# Patient Record
Sex: FEMALE | Race: ASIAN | NOT HISPANIC OR LATINO | Employment: OTHER | URBAN - METROPOLITAN AREA
[De-identification: names, ages, dates, MRNs, and addresses within clinical notes are randomized per-mention and may not be internally consistent; named-entity substitution may affect disease eponyms.]

---

## 2018-02-13 ENCOUNTER — OFFICE VISIT (OUTPATIENT)
Dept: MEDICAL GROUP | Facility: MEDICAL CENTER | Age: 78
End: 2018-02-13
Payer: MEDICARE

## 2018-02-13 VITALS
HEIGHT: 59 IN | RESPIRATION RATE: 14 BRPM | OXYGEN SATURATION: 95 % | HEART RATE: 72 BPM | DIASTOLIC BLOOD PRESSURE: 68 MMHG | BODY MASS INDEX: 18.14 KG/M2 | WEIGHT: 90 LBS | SYSTOLIC BLOOD PRESSURE: 116 MMHG | TEMPERATURE: 97.6 F

## 2018-02-13 DIAGNOSIS — E78.5 HYPERLIPIDEMIA, UNSPECIFIED HYPERLIPIDEMIA TYPE: ICD-10-CM

## 2018-02-13 DIAGNOSIS — Z82.49 FAMILY HISTORY OF HEART DISEASE: ICD-10-CM

## 2018-02-13 DIAGNOSIS — Z12.11 SCREEN FOR COLON CANCER: ICD-10-CM

## 2018-02-13 DIAGNOSIS — E55.9 VITAMIN D DEFICIENCY: ICD-10-CM

## 2018-02-13 DIAGNOSIS — M81.0 AGE-RELATED OSTEOPOROSIS WITHOUT CURRENT PATHOLOGICAL FRACTURE: ICD-10-CM

## 2018-02-13 PROCEDURE — 99204 OFFICE O/P NEW MOD 45 MIN: CPT | Performed by: FAMILY MEDICINE

## 2018-02-13 ASSESSMENT — PATIENT HEALTH QUESTIONNAIRE - PHQ9: CLINICAL INTERPRETATION OF PHQ2 SCORE: 0

## 2018-02-13 NOTE — LETTER
[employer]  Opal Delaney M.D.  [address]  [place] [address]  Fax: [phone]   Authorization for Release/Disclosure of   Protected Health Information   Name: COURT SANDERS : 1940 SSN: [id number]   Address: [facility] [address]  [place] [address] Phone:    [phone] (home)    I authorize the entity listed below to release/disclose the PHI below to:   [facility]/Opal Delaney M.D. and Opal Delaney M.D.   Provider or Entity Name:  Dr nehemiah lópez [facility])   Address   City, [facility], [facility]   Phone:      Fax:     Reason for request: continuity of care   Information to be released:    [  ] LAST COLONOSCOPY,  including any PATH REPORT and follow-up  [  ] LAST FIT/COLOGUARD RESULT [  ] LAST DEXA  [  ] LAST MAMMOGRAM  [  ] LAST PAP  [  ] LAST LABS [  ] RETINA EXAM REPORT  [  ] IMMUNIZATION RECORDS  [x ] Release all info      [  ] Check here and initial the line next to each item to release ALL health information INCLUDING  _____ Care and treatment for drug and / or alcohol abuse  _____ HIV testing, infection status, or AIDS  _____ Genetic Testing    DATES OF SERVICE OR TIME PERIOD TO BE DISCLOSED: _____________  I understand and acknowledge that:  * This Authorization may be revoked at any time by you in writing, except if your health information has already been used or disclosed.  * Your health information that will be used or disclosed as a result of you signing this authorization could be re-disclosed by the recipient. If this occurs, your re-disclosed health information may no longer be protected by State or Federal laws.  * You may refuse to sign this Authorization. Your refusal will not affect your ability to obtain treatment.  * This Authorization becomes effective upon signing and will  on (date) __________.      If no date is indicated, this Authorization will  one (1) year from the signature date.    Name: Court Sanders    Signature:   Date:          2/13/2018       PLEASE FAX REQUESTED RECORDS BACK TO: [phone]

## 2018-02-13 NOTE — PROGRESS NOTES
cc: Elevated cholesterol    Subjective:     Court Cortes is a 77 y.o. female presenting to establish care and to discuss the followin. Cholesterol: Has a history of mildly elevated cholesterol. Is currently not taking any medications for this, has not needed this. She eats very healthy, is vegetarian and lactose intolerant. She exercises regularly every day. She likes to have her cholesterol checked once a year.    2. Vitamin D deficiency, osteoporosis: Has a history of vitamin D deficiency and osteoporosis. She reports that her last DEXA scan was several years ago, is not interested in repeating this as she knows that this will not change. She was on a medication for osteoporosis, but stopped it after 2 years as she was worried about developing jaw necrosis. Denies any fractures. Has been taking vitamin D supplements regularly    3. Heart disease: Has a family history of heart disease and strokes. She likes to check her CRP levels as a marker for inflammation. She requests this lab. She does have a history of a subarachnoid hemorrhage. But this was likely secondary to a fall after vigorous exercise and dehydration. She has had no neurologic issues such    Review of systems:  See above.   Denies any weight loss, fevers, fatigue, vision changes, sore throat, swollen glands, rashes, shortness of breath, chest pain, numbness, nausea, vomiting, diarrhea, constipation, abdominal pain, dysuria, hematuria,  joint pain, muscle weakness, depression. All other systems were reviewed and are negative      Current Outpatient Prescriptions:   •  Probiotic Product (PROBIOTIC DAILY PO), Take  by mouth., Disp: , Rfl:   •  Nutritional Supplements (VITAMIN D MAINTENANCE PO), Take  by mouth., Disp: , Rfl:   •  Multiple Vitamins-Minerals (EYE VITAMINS) TABS, Take 1 Tab by mouth every day., Disp: , Rfl:     Allergies   Allergen Reactions   • Sulfamethoxazole-Trimethoprim [Bactrim Ds] Nausea       Past Medical History:   Diagnosis  "Date   • SAH (subarachnoid hemorrhage) (CMS-HCC) 1/23/2015     Past Surgical History:   Procedure Laterality Date   • TONSILLECTOMY       Family History   Problem Relation Age of Onset   • Stroke Sister    • Hypertension Sister    • Stroke Brother    • Hypertension Brother      Social History     Social History   • Marital status: Single     Spouse name: N/A   • Number of children: N/A   • Years of education: N/A     Occupational History   • Not on file.     Social History Main Topics   • Smoking status: Former Smoker   • Smokeless tobacco: Never Used   • Alcohol use No   • Drug use: No   • Sexual activity: Not on file     Other Topics Concern   • Not on file     Social History Narrative   • No narrative on file       Objective:     Vitals: /68   Pulse 72   Temp 36.4 °C (97.6 °F)   Resp 14   Ht 1.499 m (4' 11\")   Wt 40.8 kg (90 lb)   SpO2 95%   BMI 18.18 kg/m²   General: Alert, pleasant, NAD  HEENT: Normocephalic.  PERRL, EOMI, no icterus or pallor.  Conjunctivae and lids normal. External ears normal.  Neck supple.  No thyromegaly or masses palpated. No cervical or supraclavicular lymphadenopathy.  Heart: Regular rate and rhythm.  S1 and S2 normal.  No murmurs appreciated.  Respiratory: Normal respiratory effort.  Clear to auscultation bilaterally.  Abdomen: Non-distended, soft  Skin: Warm, dry, no rashes.  Musculoskeletal: Gait is normal.  Moves all extremities well.  Extremities: No leg edema.  Radial pulses 2+ symmetric.   Neurological: No tremors  Psych:  Affect/mood is normal, judgement is good, memory is intact, grooming is appropriate.    Assessment/Plan:     Diagnoses and all orders for this visit:    Hyperlipidemia, unspecified hyperlipidemia type  Continue with healthy diet and exercise. Labs ordered  -     LIPID PROFILE; Future  -     COMP METABOLIC PANEL; Future    Vitamin D deficiency  Continue supplementation  -     VITAMIN D,25 HYDROXY; Future    Age-related osteoporosis without current " pathological fracture  Stable, continue supplementation with vitamin D and calcium. She declines another DEXA scan    Family history of heart disease  Discussed nonspecific nature of CRP levels. Recommended that she check with insurance first to see if this lab is covered  -     CRP HIGH SENSITIVE (CARDIAC); Future    Screen for colon cancer  -     OCCULT BLOOD FECES IMMUNOASSAY; Future      Records requested. She declined immunizations and mammogram      Return in about 1 year (around 2/13/2019) for routine follow up.

## 2018-03-16 ENCOUNTER — HOSPITAL ENCOUNTER (INPATIENT)
Facility: MEDICAL CENTER | Age: 78
LOS: 1 days | DRG: 392 | End: 2018-03-18
Attending: EMERGENCY MEDICINE | Admitting: STUDENT IN AN ORGANIZED HEALTH CARE EDUCATION/TRAINING PROGRAM
Payer: MEDICARE

## 2018-03-16 ENCOUNTER — RESOLUTE PROFESSIONAL BILLING HOSPITAL PROF FEE (OUTPATIENT)
Dept: HOSPITALIST | Facility: MEDICAL CENTER | Age: 78
End: 2018-03-16
Payer: MEDICARE

## 2018-03-16 DIAGNOSIS — R11.2 NON-INTRACTABLE VOMITING WITH NAUSEA, UNSPECIFIED VOMITING TYPE: ICD-10-CM

## 2018-03-16 DIAGNOSIS — R19.7 DIARRHEA, UNSPECIFIED TYPE: ICD-10-CM

## 2018-03-16 DIAGNOSIS — E87.1 HYPONATREMIA: ICD-10-CM

## 2018-03-16 PROBLEM — E86.0 DEHYDRATION: Status: ACTIVE | Noted: 2018-03-16

## 2018-03-16 PROBLEM — R11.10 VOMITING: Status: ACTIVE | Noted: 2018-03-16

## 2018-03-16 LAB
ALBUMIN SERPL BCP-MCNC: 3.6 G/DL (ref 3.2–4.9)
ALBUMIN/GLOB SERPL: 1.3 G/DL
ALP SERPL-CCNC: 95 U/L (ref 30–99)
ALT SERPL-CCNC: 12 U/L (ref 2–50)
ANION GAP SERPL CALC-SCNC: 8 MMOL/L (ref 0–11.9)
APPEARANCE UR: CLEAR
AST SERPL-CCNC: 25 U/L (ref 12–45)
BASOPHILS # BLD AUTO: 0.4 % (ref 0–1.8)
BASOPHILS # BLD: 0.02 K/UL (ref 0–0.12)
BILIRUB SERPL-MCNC: 0.6 MG/DL (ref 0.1–1.5)
BUN SERPL-MCNC: 9 MG/DL (ref 8–22)
CALCIUM SERPL-MCNC: 8.6 MG/DL (ref 8.4–10.2)
CHLORIDE SERPL-SCNC: 97 MMOL/L (ref 96–112)
CO2 SERPL-SCNC: 20 MMOL/L (ref 20–33)
COLOR UR: YELLOW
CREAT SERPL-MCNC: 0.63 MG/DL (ref 0.5–1.4)
EOSINOPHIL # BLD AUTO: 0.03 K/UL (ref 0–0.51)
EOSINOPHIL NFR BLD: 0.5 % (ref 0–6.9)
ERYTHROCYTE [DISTWIDTH] IN BLOOD BY AUTOMATED COUNT: 38 FL (ref 35.9–50)
GLOBULIN SER CALC-MCNC: 2.7 G/DL (ref 1.9–3.5)
GLUCOSE SERPL-MCNC: 109 MG/DL (ref 65–99)
GLUCOSE UR STRIP.AUTO-MCNC: NEGATIVE MG/DL
HCT VFR BLD AUTO: 34.9 % (ref 37–47)
HGB BLD-MCNC: 12.3 G/DL (ref 12–16)
IMM GRANULOCYTES # BLD AUTO: 0.01 K/UL (ref 0–0.11)
IMM GRANULOCYTES NFR BLD AUTO: 0.2 % (ref 0–0.9)
KETONES UR STRIP.AUTO-MCNC: 40 MG/DL
LEUKOCYTE ESTERASE UR QL STRIP.AUTO: NEGATIVE
LYMPHOCYTES # BLD AUTO: 0.7 K/UL (ref 1–4.8)
LYMPHOCYTES NFR BLD: 12.5 % (ref 22–41)
MCH RBC QN AUTO: 31.5 PG (ref 27–33)
MCHC RBC AUTO-ENTMCNC: 35.2 G/DL (ref 33.6–35)
MCV RBC AUTO: 89.3 FL (ref 81.4–97.8)
MONOCYTES # BLD AUTO: 0.26 K/UL (ref 0–0.85)
MONOCYTES NFR BLD AUTO: 4.7 % (ref 0–13.4)
NEUTROPHILS # BLD AUTO: 4.57 K/UL (ref 2–7.15)
NEUTROPHILS NFR BLD: 81.7 % (ref 44–72)
NITRITE UR QL STRIP.AUTO: NEGATIVE
NRBC # BLD AUTO: 0 K/UL
NRBC BLD-RTO: 0 /100 WBC
PH UR STRIP.AUTO: 7 [PH]
PLATELET # BLD AUTO: 233 K/UL (ref 164–446)
PMV BLD AUTO: 8.6 FL (ref 9–12.9)
POTASSIUM SERPL-SCNC: 3.6 MMOL/L (ref 3.6–5.5)
PROT SERPL-MCNC: 6.3 G/DL (ref 6–8.2)
PROT UR QL STRIP: NEGATIVE MG/DL
RBC # BLD AUTO: 3.91 M/UL (ref 4.2–5.4)
RBC UR QL AUTO: ABNORMAL
SODIUM SERPL-SCNC: 125 MMOL/L (ref 135–145)
SP GR UR STRIP.AUTO: 1.01
WBC # BLD AUTO: 5.6 K/UL (ref 4.8–10.8)

## 2018-03-16 PROCEDURE — 99285 EMERGENCY DEPT VISIT HI MDM: CPT

## 2018-03-16 PROCEDURE — 96374 THER/PROPH/DIAG INJ IV PUSH: CPT

## 2018-03-16 PROCEDURE — 85025 COMPLETE CBC W/AUTO DIFF WBC: CPT

## 2018-03-16 PROCEDURE — 81002 URINALYSIS NONAUTO W/O SCOPE: CPT

## 2018-03-16 PROCEDURE — G0378 HOSPITAL OBSERVATION PER HR: HCPCS

## 2018-03-16 PROCEDURE — 99219 PR INITIAL OBSERVATION CARE,LEVL II: CPT | Performed by: STUDENT IN AN ORGANIZED HEALTH CARE EDUCATION/TRAINING PROGRAM

## 2018-03-16 PROCEDURE — 700105 HCHG RX REV CODE 258: Performed by: EMERGENCY MEDICINE

## 2018-03-16 PROCEDURE — 700111 HCHG RX REV CODE 636 W/ 250 OVERRIDE (IP): Performed by: EMERGENCY MEDICINE

## 2018-03-16 PROCEDURE — 36415 COLL VENOUS BLD VENIPUNCTURE: CPT

## 2018-03-16 PROCEDURE — 80053 COMPREHEN METABOLIC PANEL: CPT

## 2018-03-16 RX ORDER — ONDANSETRON 2 MG/ML
4 INJECTION INTRAMUSCULAR; INTRAVENOUS ONCE
Status: COMPLETED | OUTPATIENT
Start: 2018-03-16 | End: 2018-03-16

## 2018-03-16 RX ORDER — SODIUM CHLORIDE 9 MG/ML
INJECTION, SOLUTION INTRAVENOUS CONTINUOUS
Status: DISCONTINUED | OUTPATIENT
Start: 2018-03-16 | End: 2018-03-17

## 2018-03-16 RX ADMIN — ONDANSETRON 4 MG: 2 INJECTION INTRAMUSCULAR; INTRAVENOUS at 21:20

## 2018-03-16 RX ADMIN — SODIUM CHLORIDE: 9 INJECTION, SOLUTION INTRAVENOUS at 22:03

## 2018-03-16 ASSESSMENT — PAIN SCALES - GENERAL: PAINLEVEL_OUTOF10: 0

## 2018-03-17 PROBLEM — K52.9 GASTROENTERITIS: Status: ACTIVE | Noted: 2018-03-17

## 2018-03-17 LAB
SODIUM SERPL-SCNC: 131 MMOL/L (ref 135–145)
SODIUM SERPL-SCNC: 136 MMOL/L (ref 135–145)
SODIUM SERPL-SCNC: 141 MMOL/L (ref 135–145)

## 2018-03-17 PROCEDURE — 36415 COLL VENOUS BLD VENIPUNCTURE: CPT

## 2018-03-17 PROCEDURE — 770006 HCHG ROOM/CARE - MED/SURG/GYN SEMI*

## 2018-03-17 PROCEDURE — 700105 HCHG RX REV CODE 258: Performed by: STUDENT IN AN ORGANIZED HEALTH CARE EDUCATION/TRAINING PROGRAM

## 2018-03-17 PROCEDURE — 700111 HCHG RX REV CODE 636 W/ 250 OVERRIDE (IP): Performed by: STUDENT IN AN ORGANIZED HEALTH CARE EDUCATION/TRAINING PROGRAM

## 2018-03-17 PROCEDURE — 99232 SBSQ HOSP IP/OBS MODERATE 35: CPT | Performed by: INTERNAL MEDICINE

## 2018-03-17 PROCEDURE — 84295 ASSAY OF SERUM SODIUM: CPT

## 2018-03-17 RX ORDER — SODIUM CHLORIDE 9 MG/ML
INJECTION, SOLUTION INTRAVENOUS CONTINUOUS
Status: DISCONTINUED | OUTPATIENT
Start: 2018-03-17 | End: 2018-03-18 | Stop reason: HOSPADM

## 2018-03-17 RX ORDER — PANTOPRAZOLE SODIUM 40 MG/10ML
40 INJECTION, POWDER, LYOPHILIZED, FOR SOLUTION INTRAVENOUS DAILY
Status: DISCONTINUED | OUTPATIENT
Start: 2018-03-17 | End: 2018-03-18 | Stop reason: HOSPADM

## 2018-03-17 RX ORDER — ONDANSETRON 2 MG/ML
4 INJECTION INTRAMUSCULAR; INTRAVENOUS EVERY 4 HOURS PRN
Status: DISCONTINUED | OUTPATIENT
Start: 2018-03-17 | End: 2018-03-18 | Stop reason: HOSPADM

## 2018-03-17 RX ADMIN — ONDANSETRON 4 MG: 2 INJECTION INTRAMUSCULAR; INTRAVENOUS at 01:17

## 2018-03-17 RX ADMIN — SODIUM CHLORIDE: 9 INJECTION, SOLUTION INTRAVENOUS at 00:55

## 2018-03-17 RX ADMIN — SODIUM CHLORIDE: 9 INJECTION, SOLUTION INTRAVENOUS at 07:52

## 2018-03-17 ASSESSMENT — LIFESTYLE VARIABLES
EVER_SMOKED: NEVER
DO YOU DRINK ALCOHOL: NO

## 2018-03-17 ASSESSMENT — ENCOUNTER SYMPTOMS
SHORTNESS OF BREATH: 0
BLOOD IN STOOL: 0
MUSCULOSKELETAL NEGATIVE: 1
WEAKNESS: 1
EYES NEGATIVE: 1
DEPRESSION: 0
DIAPHORESIS: 0
CHILLS: 0
DIZZINESS: 0
BLURRED VISION: 0
ABDOMINAL PAIN: 1
RESPIRATORY NEGATIVE: 1
DIARRHEA: 1
HEARTBURN: 0
FOCAL WEAKNESS: 0
CARDIOVASCULAR NEGATIVE: 1
MYALGIAS: 0
PND: 0
NAUSEA: 1
SINUS PAIN: 0
WHEEZING: 0
SORE THROAT: 0
SPUTUM PRODUCTION: 0
COUGH: 0
HEMOPTYSIS: 0
ORTHOPNEA: 0
VOMITING: 1
FEVER: 0
PSYCHIATRIC NEGATIVE: 1
CONSTIPATION: 0
PALPITATIONS: 0
ABDOMINAL PAIN: 0

## 2018-03-17 ASSESSMENT — PATIENT HEALTH QUESTIONNAIRE - PHQ9
2. FEELING DOWN, DEPRESSED, IRRITABLE, OR HOPELESS: NOT AT ALL
SUM OF ALL RESPONSES TO PHQ9 QUESTIONS 1 AND 2: 0
SUM OF ALL RESPONSES TO PHQ QUESTIONS 1-9: 0
1. LITTLE INTEREST OR PLEASURE IN DOING THINGS: NOT AT ALL

## 2018-03-17 ASSESSMENT — PAIN SCALES - GENERAL
PAINLEVEL_OUTOF10: 0

## 2018-03-17 NOTE — H&P
Hospital Medicine History and Physical    Date of Service  3/16/2018    Chief Complaint  Chief Complaint   Patient presents with   • N/V     Onset today at 4pm   • Diarrhea       History of Presenting Illness  77 y.o. female who presented 3/16/2018 with multiple episodes of vomiting and diarrhea starting 3 PM this afternoon. Patient states that she had soup from [employer] the night before and woke up nauseous. Her symptoms have not resolved and then. She also describes weakness, denies fevers or chills. No sick contacts. Denies chest pain shortness of breath or cough. No new medications or antibiotics. No abdominal pain.   Primary Care Physician  Opal Delaney M.D.    Consultants  None    Code Status  DNR    Review of Systems  Review of Systems   Constitutional: Positive for malaise/fatigue. Negative for chills, diaphoresis and fever.   HENT: Negative for congestion, ear pain, hearing loss, sinus pain, sore throat and tinnitus.    Eyes: Negative for blurred vision.   Respiratory: Negative for cough, hemoptysis, sputum production, shortness of breath and wheezing.    Cardiovascular: Negative for chest pain, palpitations, orthopnea, leg swelling and PND.   Gastrointestinal: Positive for diarrhea, nausea and vomiting. Negative for abdominal pain, blood in stool, constipation and heartburn.   Genitourinary: Negative for dysuria and urgency.   Musculoskeletal: Negative for myalgias.   Skin: Negative for rash.   Neurological: Positive for weakness. Negative for dizziness and focal weakness.   Psychiatric/Behavioral: Negative for depression.        Past Medical History  Past Medical History:   Diagnosis Date   • SAH (subarachnoid hemorrhage) (CMS-[facility]) 1/23/2015       Surgical History  Past Surgical History:   Procedure Laterality Date   • TONSILLECTOMY         Medications  No current facility-administered medications on file prior to encounter.      Current Outpatient Prescriptions on File Prior to Encounter    Medication Sig Dispense Refill   • Probiotic Product (PROBIOTIC DAILY PO) Take  by mouth.     • Nutritional Supplements (VITAMIN D MAINTENANCE PO) Take  by mouth.     • Multiple Vitamins-Minerals (EYE VITAMINS) TABS Take 1 Tab by mouth every day.         Family History  Family History   Problem Relation Age of Onset   • Stroke Sister    • Hypertension Sister    • Stroke Brother    • Hypertension Brother        Social History  Social History   Substance Use Topics   • Smoking status: Former Smoker   • Smokeless tobacco: Never Used   • Alcohol use No       Allergies  Allergies   Allergen Reactions   • Sulfamethoxazole-Trimethoprim [Bactrim Ds] Nausea        Physical Exam  Laboratory   Hemodynamics  Temp (24hrs), Av.8 °C (98.2 °F), Min:36.8 °C (98.2 °F), Max:36.8 °C (98.2 °F)   Temperature: 36.8 °C (98.2 °F)  Pulse  Av.8  Min: 85  Max: 97    Blood Pressure : 111/64, NIBP: 122/65      Respiratory      Respiration: 18, Pulse Oximetry: 95 %             Physical Exam   Constitutional: She is oriented to person, place, and time. She appears well-developed.   HENT:   Head: Normocephalic and atraumatic.   Eyes: Conjunctivae are normal. Pupils are equal, round, and reactive to light.   Neck: Normal range of motion. No JVD present.   Cardiovascular: Normal rate, regular rhythm and normal heart sounds.    No murmur heard.  Pulmonary/Chest: Effort normal and breath sounds normal. No respiratory distress. She has no wheezes. She has no rales. She exhibits no tenderness.   Abdominal: Soft. She exhibits no distension. There is no tenderness. There is no rebound and no guarding.   Musculoskeletal: Normal range of motion. She exhibits no edema.   Neurological: She is alert and oriented to person, place, and time.   Skin: Skin is warm.       Recent Labs      18   2125   WBC  5.6   RBC  3.91*   HEMOGLOBIN  12.3   HEMATOCRIT  34.9*   MCV  89.3   MCH  31.5   MCHC  35.2*   RDW  38.0   PLATELETCT  233   MPV  8.6*     Recent  Labs      03/16/18 2125   SODIUM  125*   POTASSIUM  3.6   CHLORIDE  97   CO2  20   GLUCOSE  109*   BUN  9   CREATININE  0.63   CALCIUM  8.6     Recent Labs      03/16/18 2125   ALTSGPT  12   ASTSGOT  25   ALKPHOSPHAT  95   TBILIRUBIN  0.6   GLUCOSE  109*                 Lab Results   Component Value Date    TROPONINI <0.01 01/23/2015     Urinalysis:    Lab Results  Component Value Date/Time   SPECGRAVITY 1.008 01/23/2015 1700   GLUCOSEUR Negative 01/23/2015 1700   KETONES 40 (A) 01/23/2015 1700   NITRITE Negative 01/23/2015 1700        Imaging    None    Assessment/Plan     I anticipate this patient is appropriate for observation status at this point.    Vomiting- (present on admission)   Assessment & Plan    Likely gastroenteritis-like picture. Will place patient on antiemetics and clear liquid diet to advance as tolerated.        Diarrhea- (present on admission)   Assessment & Plan    Patient presents with diarrhea that started at 3 PM today with multiple episodes of vomiting. Patient thinks that it is secondary to some soup she had had the night before from [employer]. No sick contacts. No fevers or chills. Patient has low risk for C. diff otherwise. Will monitor the patient on fluids for now and observe overnight. If patient's symptoms do not improve would consider obtaining a CT abdomen and possibly initiating antibiotics, however will hold off on antibiotics for now.        Hyponatremia- (present on admission)   Assessment & Plan    Likely hypovolemic due to vomiting and diarrhea. We'll check serum sodiums every 4 and start an S at 100 mL per hour. Will hold off on further workup unless serum sodium worsens.        Dehydration- (present on admission)   Assessment & Plan    Secondary to poor oral intake, vomiting and diarrhea. Will place the patient on IV fluids and assess clinical response, we'll start clears as tolerated.            VTE prophylaxis: SCDs

## 2018-03-17 NOTE — CARE PLAN
Problem: Nutritional:  Goal: Achieve adequate nutritional intake  Diet advancement beyond clears with PO intake of 50%  Outcome: NOT MET

## 2018-03-17 NOTE — PROGRESS NOTES
Pt arrived via gurney to unit. Pt able to ambulate from gurney to bed with hand held assist. Pt awake and alert. States no pain at this time.

## 2018-03-17 NOTE — PROGRESS NOTES
[facility] Hospitalist Progress Note    Date of Service: 3/17/2018    Chief Complaint  77 y.o. female with past medical history of  SAH , admitted 3/16/2018 with complaints of nausea, vomiting, diarrhea, abdominal pain, poor appetite started on the day of admission.    Interval Problem Update  Patient thinks she has food poisoning in from soap from [employer] the night before symptoms started. Apparently her symptoms has largely resolved by now. She denies fever or chills. No abdominal pain. Slightly nauseated still. No bowel movement since yesterday. Apparent generalized weakness. Started clear liquid diet. Low-sodium improved. Rehydrated. On IV fluids.    Consultants/Specialty  None    Disposition  Home when clinically improved        Review of Systems   Constitutional: Positive for malaise/fatigue.   HENT: Negative.    Eyes: Negative.    Respiratory: Negative.    Cardiovascular: Negative.    Gastrointestinal: Positive for abdominal pain, diarrhea, nausea and vomiting.   Genitourinary: Negative.    Musculoskeletal: Negative.    Skin: Negative.    Neurological: Positive for weakness.   Endo/Heme/Allergies: Negative.    Psychiatric/Behavioral: Negative.    All other systems reviewed and are negative.     Physical Exam  Laboratory/Imaging   Hemodynamics  Temp (24hrs), Av.7 °C (98.1 °F), Min:36.7 °C (98 °F), Max:36.8 °C (98.2 °F)   Temperature: 36.7 °C (98 °F)  Pulse  Av  Min: 85  Max: 98    Blood Pressure : 100/56, NIBP: 122/65      Respiratory      Respiration: 18, Pulse Oximetry: 93 %             Fluids    Intake/Output Summary (Last 24 hours) at 18 1229  Last data filed at 18 1200   Gross per 24 hour   Intake              940 ml   Output                0 ml   Net              940 ml       Nutrition  Orders Placed This Encounter   Procedures   • Diet Order     Standing Status:   Standing     Number of Occurrences:   1     Order Specific Question:   Diet:     Answer:   Clear Liquid [10]      Physical Exam   Constitutional: She is oriented to person, place, and time. She appears well-developed and well-nourished.   HENT:   Head: Normocephalic and atraumatic.   Eyes: EOM are normal. Pupils are equal, round, and reactive to light.   Neck: Normal range of motion. Neck supple.   Cardiovascular: Normal rate and regular rhythm.    Pulmonary/Chest: Effort normal and breath sounds normal.   Abdominal: Soft. Bowel sounds are normal. There is no tenderness.   Musculoskeletal: Normal range of motion.   Neurological: She is alert and oriented to person, place, and time.   Skin: Skin is warm and dry.   Psychiatric: Her speech is delayed.   Nursing note and vitals reviewed.      Recent Labs      03/16/18 2125   WBC  5.6   RBC  3.91*   HEMOGLOBIN  12.3   HEMATOCRIT  34.9*   MCV  89.3   MCH  31.5   MCHC  35.2*   RDW  38.0   PLATELETCT  233   MPV  8.6*     Recent Labs      03/16/18 2125  03/17/18   0212  03/17/18   0602  03/17/18   1011   SODIUM  125*  131*  136  141   POTASSIUM  3.6   --    --    --    CHLORIDE  97   --    --    --    CO2  20   --    --    --    GLUCOSE  109*   --    --    --    BUN  9   --    --    --    CREATININE  0.63   --    --    --    CALCIUM  8.6   --    --    --                       Assessment/Plan     Vomiting- (present on admission)   Assessment & Plan    Antiemetics as needed        Diarrhea- (present on admission)   Assessment & Plan    Improving. Low risk for Cdiff or parasites        Gastroenteritis   Assessment & Plan    Supportive treatment, IV fluid.  Improving.  Low suspicion for infectious process.        Hyponatremia- (present on admission)   Assessment & Plan    Resolved        Dehydration- (present on admission)   Assessment & Plan    IV fluids. start clears as tolerated.          Quality-Core Measures

## 2018-03-17 NOTE — ED NOTES
Discussed POC with ERP. Pt soiled herself and requesting female assistance. Per ERP no need to straight cath pt, obtain urine specimen when pt voids next.

## 2018-03-17 NOTE — DIETARY
"Nutrition services: Day 1 of admit.  Court Cortes is a 77 y.o. female with admitting DX of diarrhea and vomiting  Patient seen for BMI less than 19.  BMI = 18.85    Assessment:  Height: 147.3 cm (4' 10\")  Weight: 40.9 kg (90 lb 2.7 oz)  Body mass index is 18.85 kg/m².  Diet Rx:  Clear Liquids    Evaluation:   1. Patient with vomiting and diarrhea on day of admit.  Per MD notes, likely gastroenteritis.  Possibly secondary to soup the patient ate the night before.  2. No reported weight loss or poor PO intake prior to admit.  3. Labs and medications reviewed  4. Skin:  WDL    Recommendations/Plan:  1. RD to monitor diet advancement and PO intake.  2. Nutrition Services to work with patient for food preferences when diet advanced.        "

## 2018-03-17 NOTE — PROGRESS NOTES
Pt up to the br x 2 so far with sba.gait steady.voiding w/o diff.  Iv infusing.lucy clear liquids.  No n/v  Denies pain at present.

## 2018-03-17 NOTE — ED PROVIDER NOTES
ED Provider Note    CHIEF COMPLAINT  Chief Complaint   Patient presents with   • N/V     Onset today at 4pm   • Diarrhea        HPI  Court Cortes is a 77 y.o. female who presents for evaluation of vomiting and diarrhea. The patient denies any fevers. She has no pain. She states at 4 PM this afternoon she started having vomiting and diarrhea that have been continuous since then. She thinks that she may have gotten food poisoning from something she ate at lunch today. She has no history of previous abdominal surgeries. She is not a diabetic. The patient states that she does feel somewhat dehydrated.    REVIEW OF SYSTEMS  See HPI for further details. All other systems are negative.     PAST MEDICAL HISTORY  Past Medical History:   Diagnosis Date   • SAH (subarachnoid hemorrhage) (CMS-[facility]) 1/23/2015       FAMILY HISTORY  Family History   Problem Relation Age of Onset   • Stroke Sister    • Hypertension Sister    • Stroke Brother    • Hypertension Brother        SOCIAL HISTORY  Social History     Social History   • Marital status: Single     Spouse name: N/A   • Number of children: N/A   • Years of education: N/A     Social History Main Topics   • Smoking status: Former Smoker   • Smokeless tobacco: Never Used   • Alcohol use No   • Drug use: No   • Sexual activity: Not on file     Other Topics Concern   • Not on file     Social History Narrative   • No narrative on file       SURGICAL HISTORY  Past Surgical History:   Procedure Laterality Date   • TONSILLECTOMY         CURRENT MEDICATIONS  Home Medications     Reviewed by Georgiana Marie R.N. (Registered Nurse) on 03/16/18 at 2100  Med List Status: <None>   Medication Last Dose Status   Multiple Vitamins-Minerals (EYE VITAMINS) TABS 2/13/2018 Active   Nutritional Supplements (VITAMIN D MAINTENANCE PO) 2/13/2018 Active   Probiotic Product (PROBIOTIC DAILY PO) 2/13/2018 Active                ALLERGIES  Allergies   Allergen Reactions   • Sulfamethoxazole-Trimethoprim  "[Bactrim Ds] Nausea       PHYSICAL EXAM  VITAL SIGNS: /64   Pulse 85   Temp 36.8 °C (98.2 °F)   Resp 18   Ht 1.422 m (4' 8\")   Wt 40.8 kg (90 lb)   SpO2 95%   BMI 20.18 kg/m²     Constitutional: Thin elderly female holding an emesis bag to her mouth with no active vomiting..   HENT: Normocephalic, Atraumatic. Somewhat dry mucous membranes.  Eyes: EOMI, Conjunctiva normal, No discharge.   Cardiovascular: Normal heart rate, Normal rhythm, No murmurs, No rubs, No gallops.   Thorax & Lungs: Lungs clear to auscultation bilaterally without wheezes, rales or rhonchi. No respiratory distress.    Abdomen: Soft and nontender.  Skin: Warm, Dry.   Musculoskeletal: Good range of motion in all major joints.  Neurologic: Awake alert, No focal deficits noted.       COURSE & MEDICAL DECISION MAKING  Pertinent Labs & Imaging studies reviewed. (See chart for details)  This is a 77-year-old here for evaluation of vomiting and diarrhea. She is not tachycardic or hypotensive. She is afebrile. She has completely benign abdominal exam. An IV is established and laboratories were obtained. These included chemistries which are normal with the exception of a sodium of 125. CBC shows normal white count with a differential of 81 polys and 12 lymphocytes. She is treated with Zofran IV. Upon repeat evaluation she states her nausea is completely resolved and she has had no further vomiting here. She states she still feeling quite dry and very weak. For that reason she will be started on a normal saline drip. With her hyponatremia and generalized weakness I suspect that she will benefit from hospitalization for hydration which I think should resolve her hyponatremia. I discussed case with the hospitalist and they will be the primary admitting physicians. Patient understands the plan for admission.    FINAL IMPRESSION  1. Nausea and vomiting  2. Diarrhea  3. Hyponatremia         Electronically signed by: Tomi Gonzalez, 3/16/2018 9:16 " PM

## 2018-03-17 NOTE — ED NOTES
Pt assisted to BSC. Pt very weak. 1 person assist to BSC. Urine specimen obtained and POC urinalysis running.

## 2018-03-17 NOTE — ED TRIAGE NOTES
"Chief Complaint   Patient presents with   • N/V     Onset today at 4pm   • Diarrhea     /64   Pulse 85   Temp 36.8 °C (98.2 °F)   Resp 18   Ht 1.422 m (4' 8\")   Wt 40.8 kg (90 lb)   SpO2 95%   BMI 20.18 kg/m²     Pt life partner Thang at bedside. Reports lower back pain for last few days after bending over and feeling something pulled.  "

## 2018-03-17 NOTE — DISCHARGE PLANNING
Medical Social Work    Referral: Discharge Planning     Intervention: Per attending the pt is projected to discharge home tomorrow with not needs    Plan: Discharge home no needs.

## 2018-03-17 NOTE — PROGRESS NOTES
Tele Summary @6946    Rhythm : Sinus Rhythm, First degree AVB   Ectopy : none  HR : 90  OR : 0.26  QRS : 0.08  QT : 0.40     Any further monitoring will be done by patient's Primary Nurse.

## 2018-03-17 NOTE — PROGRESS NOTES
Telemetry Summary    Rhythm ST  -101  Ectopy No ectopy, 1st degree HB  DC 0.22  QRS 0.08  QT 0.36

## 2018-03-18 VITALS
OXYGEN SATURATION: 95 % | BODY MASS INDEX: 18.93 KG/M2 | SYSTOLIC BLOOD PRESSURE: 111 MMHG | HEIGHT: 58 IN | TEMPERATURE: 97.9 F | DIASTOLIC BLOOD PRESSURE: 54 MMHG | HEART RATE: 72 BPM | WEIGHT: 90.17 LBS | RESPIRATION RATE: 18 BRPM

## 2018-03-18 LAB
ANION GAP SERPL CALC-SCNC: 7 MMOL/L (ref 0–11.9)
BASOPHILS # BLD AUTO: 0.7 % (ref 0–1.8)
BASOPHILS # BLD: 0.02 K/UL (ref 0–0.12)
BUN SERPL-MCNC: 8 MG/DL (ref 8–22)
CALCIUM SERPL-MCNC: 8.2 MG/DL (ref 8.4–10.2)
CHLORIDE SERPL-SCNC: 110 MMOL/L (ref 96–112)
CO2 SERPL-SCNC: 23 MMOL/L (ref 20–33)
CREAT SERPL-MCNC: 0.71 MG/DL (ref 0.5–1.4)
EOSINOPHIL # BLD AUTO: 0.1 K/UL (ref 0–0.51)
EOSINOPHIL NFR BLD: 3.7 % (ref 0–6.9)
ERYTHROCYTE [DISTWIDTH] IN BLOOD BY AUTOMATED COUNT: 42.5 FL (ref 35.9–50)
GLUCOSE SERPL-MCNC: 70 MG/DL (ref 65–99)
HCT VFR BLD AUTO: 35.5 % (ref 37–47)
HGB BLD-MCNC: 11.6 G/DL (ref 12–16)
IMM GRANULOCYTES # BLD AUTO: 0 K/UL (ref 0–0.11)
IMM GRANULOCYTES NFR BLD AUTO: 0 % (ref 0–0.9)
LYMPHOCYTES # BLD AUTO: 0.89 K/UL (ref 1–4.8)
LYMPHOCYTES NFR BLD: 33 % (ref 22–41)
MCH RBC QN AUTO: 31 PG (ref 27–33)
MCHC RBC AUTO-ENTMCNC: 32.7 G/DL (ref 33.6–35)
MCV RBC AUTO: 94.9 FL (ref 81.4–97.8)
MONOCYTES # BLD AUTO: 0.4 K/UL (ref 0–0.85)
MONOCYTES NFR BLD AUTO: 14.8 % (ref 0–13.4)
NEUTROPHILS # BLD AUTO: 1.29 K/UL (ref 2–7.15)
NEUTROPHILS NFR BLD: 47.8 % (ref 44–72)
NRBC # BLD AUTO: 0 K/UL
NRBC BLD-RTO: 0 /100 WBC
PLATELET # BLD AUTO: 244 K/UL (ref 164–446)
PMV BLD AUTO: 9.1 FL (ref 9–12.9)
POTASSIUM SERPL-SCNC: 3.3 MMOL/L (ref 3.6–5.5)
RBC # BLD AUTO: 3.74 M/UL (ref 4.2–5.4)
SODIUM SERPL-SCNC: 140 MMOL/L (ref 135–145)
WBC # BLD AUTO: 2.7 K/UL (ref 4.8–10.8)

## 2018-03-18 PROCEDURE — 80048 BASIC METABOLIC PNL TOTAL CA: CPT

## 2018-03-18 PROCEDURE — 85025 COMPLETE CBC W/AUTO DIFF WBC: CPT

## 2018-03-18 PROCEDURE — 700102 HCHG RX REV CODE 250 W/ 637 OVERRIDE(OP): Performed by: INTERNAL MEDICINE

## 2018-03-18 PROCEDURE — 36415 COLL VENOUS BLD VENIPUNCTURE: CPT

## 2018-03-18 PROCEDURE — A9270 NON-COVERED ITEM OR SERVICE: HCPCS | Performed by: INTERNAL MEDICINE

## 2018-03-18 PROCEDURE — 99239 HOSP IP/OBS DSCHRG MGMT >30: CPT | Performed by: INTERNAL MEDICINE

## 2018-03-18 RX ORDER — POTASSIUM CHLORIDE 20 MEQ/1
40 TABLET, EXTENDED RELEASE ORAL ONCE
Status: COMPLETED | OUTPATIENT
Start: 2018-03-18 | End: 2018-03-18

## 2018-03-18 RX ADMIN — POTASSIUM CHLORIDE 40 MEQ: 1500 TABLET, EXTENDED RELEASE ORAL at 12:20

## 2018-03-18 ASSESSMENT — PAIN SCALES - GENERAL
PAINLEVEL_OUTOF10: 0

## 2018-03-18 NOTE — DISCHARGE INSTRUCTIONS
Discharge Instructions    Discharged to home by car with relative. Discharged via wheelchair, hospital escort: Yes.  Special equipment needed: Not Applicable    Be sure to schedule a follow-up appointment with your primary care doctor or any specialists as instructed.     Discharge Plan:   Diet Plan: Discussed  Activity Level: Discussed  Confirmed Follow up Appointment: Patient to Call and Schedule Appointment  Medication Reconciliation Updated: Yes  Influenza Vaccine Indication: Patient Refuses    I understand that a diet low in cholesterol, fat, and sodium is recommended for good health. Unless I have been given specific instructions below for another diet, I accept this instruction as my diet prescription.   Other diet: gastric soft diet    Special Instructions: None    · Is patient discharged on Warfarin / Coumadin?   No       Diarrhea, Adult  Diarrhea is frequent loose and watery bowel movements. Diarrhea can make you feel weak and cause you to become dehydrated. Dehydration can make you tired and thirsty, cause you to have a dry mouth, and decrease how often you urinate. Diarrhea typically lasts 2-3 days. However, it can last longer if it is a sign of something more serious. It is important to treat your diarrhea as told by your health care provider.  Follow these instructions at home:  Eating and drinking  Follow these recommendations as told by your health care provider:  · Take an oral rehydration solution (ORS). This is a drink that is sold at pharmacies and retail stores.  · Drink clear fluids, such as water, ice chips, diluted fruit juice, and low-calorie sports drinks.  · Eat bland, easy-to-digest foods in small amounts as you are able. These foods include bananas, applesauce, rice, lean meats, toast, and crackers.  · Avoid drinking fluids that contain a lot of sugar or caffeine, such as energy drinks, sports drinks, and soda.  · Avoid alcohol.  · Avoid spicy or fatty foods.  General  instructions  · Drink enough fluid to keep your urine clear or pale yellow.  · Wash your hands often. If soap and water are not available, use hand .  · Make sure that all people in your household wash their hands well and often.  · Take over-the-counter and prescription medicines only as told by your health care provider.  · Rest at home while you recover.  · Watch your condition for any changes.  · Take a warm bath to relieve any burning or pain from frequent diarrhea episodes.  · Keep all follow-up visits as told by your health care provider. This is important.  Contact a health care provider if:  · You have a fever.  · Your diarrhea gets worse.  · You have new symptoms.  · You cannot keep fluids down.  · You feel light-headed or dizzy.  · You have a headache  · You have muscle cramps.  Get help right away if:  · You have chest pain.  · You feel extremely weak or you faint.  · You have bloody or black stools or stools that look like tar.  · You have severe pain, cramping, or bloating in your abdomen.  · You have trouble breathing or you are breathing very quickly.  · Your heart is beating very quickly.  · Your skin feels cold and clammy.  · You feel confused.  · You have signs of dehydration, such as:  ¨ Dark urine, very little urine, or no urine.  ¨ Cracked lips.  ¨ Dry mouth.  ¨ Sunken eyes.  ¨ Sleepiness.  ¨ Weakness.  This information is not intended to replace advice given to you by your health care provider. Make sure you discuss any questions you have with your health care provider.  Document Released: 12/08/2003 Document Revised: 04/27/2017 Document Reviewed: 08/23/2016  Elsevier Interactive Patient Education © 2017 [employer]      Nausea, Adult  Introduction  Feeling sick to your stomach (nausea) means that your stomach is upset or you feel like you have to throw up (vomit). Feeling sick to your stomach is usually not serious, but it may be an early sign of a more serious medical problem. As  you feel sicker to your stomach, it can lead to throwing up (vomiting). If you throw up, or if you are not able to drink enough fluids, there is a risk of dehydration. Dehydration can make you feel tired and thirsty, have a dry mouth, and pee (urinate) less often. Older adults and people who have other diseases or a weak defense (immune) system have a higher risk of dehydration.  The main goal of treating this condition is to:  · Limit how often you feel sick to your stomach.  · Prevent throwing up and dehydration.  Follow these instructions at home:  Follow instructions from your doctor about how to care for yourself at home.  Eating and drinking  Follow these recommendations as told by your doctor:  · Take an oral rehydration solution (ORS). This is a drink that is sold at pharmacies and stores.  · Drink clear fluids in small amounts as you are able, such as:  ¨ Water.  ¨ Ice chips.  ¨ Fruit juice that has water added (diluted fruit juice).  ¨ Low-calorie sports drinks.  · Eat bland, easy to digest foods in small amounts as you are able, such as:  ¨ Bananas.  ¨ Applesauce.  ¨ Rice.  ¨ Lean meats.  ¨ Toast.  ¨ Crackers.  · Avoid drinking fluids that contain a lot of sugar or caffeine.  · Avoid alcohol.  · Avoid spicy or fatty foods.  General instructions  · Drink enough fluid to keep your pee (urine) clear or pale yellow.  · Wash your hands often. If you cannot use soap and water, use hand .  · Make sure that all people in your household wash their hands well and often.  · Rest at home while you get better.  · Take over-the-counter and prescription medicines only as told by your doctor.  · Breathe slowly and deeply when you feel sick to your stomach.  · Watch your condition for any changes.  · Keep all follow-up visits as told by your doctor. This is important.  Contact a doctor if:  · You have a headache.  · You have new symptoms.  · You feel sicker to your stomach.  · You have a fever.  · You feel  light-headed or dizzy.  · You throw up.  · You are not able to keep fluids down.  Get help right away if:  · You have pain in your chest, neck, arm, or jaw.  · You feel very weak or you pass out (faint).  · You have throw up that is bright red or looks like coffee grounds.  · You have bloody or black poop (stools), or poop that looks like tar.  · You have a very bad headache, a stiff neck, or both.  · You have very bad pain, cramping, or bloating in your belly.  · You have a rash.  · You have trouble breathing or you are breathing very quickly.  · Your heart is beating very quickly.  · Your skin feels cold and clammy.  · You feel confused.  · You have pain while peeing.  · You have signs of dehydration, such as:  ¨ Dark pee, or very little or no pee.  ¨ Cracked lips.  ¨ Dry mouth.  ¨ Sunken eyes.  ¨ Sleepiness.  ¨ Weakness.  These symptoms may be an emergency. Do not wait to see if the symptoms will go away. Get medical help right away. Call your local emergency services (911 in the [place] Do not drive yourself to the hospital.   This information is not intended to replace advice given to you by your health care provider. Make sure you discuss any questions you have with your health care provider.  Document Released: 12/06/2012 Document Revised: 05/25/2017 Document Reviewed: 08/23/2016  © 2017 [employer]    Depression / Suicide Risk    As you are discharged from this [facility] facility, it is important to learn how to keep safe from harming yourself.    Recognize the warning signs:  · Abrupt changes in personality, positive or negative- including increase in energy   · Giving away possessions  · Change in eating patterns- significant weight changes-  positive or negative  · Change in sleeping patterns- unable to sleep or sleeping all the time   · Unwillingness or inability to communicate  · Depression  · Unusual sadness, discouragement and loneliness  · Talk of wanting to die  · Neglect of personal  appearance   · Rebelliousness- reckless behavior  · Withdrawal from people/activities they love  · Confusion- inability to concentrate     If you or a loved one observes any of these behaviors or has concerns about self-harm, here's what you can do:  · Talk about it- your feelings and reasons for harming yourself  · Remove any means that you might use to hurt yourself (examples: pills, rope, extension cords, firearm)  · Get professional help from the community (Mental Health, Substance Abuse, psychological counseling)  · Do not be alone:Call your Safe Contact- someone whom you trust who will be there for you.  · Call your local CRISIS HOTLINE 217-7179 or 379-778-1181  · Call your local Children's Mobile Crisis Response Team [place] (293) 740-0461 or www.Medigram  · Call the toll free National Suicide Prevention Hotlines   · National Suicide Prevention Lifeline 276-562-LYXP (8762)  · National Hope Line Network 800-SUICIDE (524-1835)

## 2018-03-18 NOTE — DISCHARGE SUMMARY
CHIEF COMPLAINT ON ADMISSION  Chief Complaint   Patient presents with   • N/V     Onset today at 4pm   • Diarrhea       CODE STATUS  DNR    HPI & HOSPITAL COURSE  This is a 77 y.o. female here with complaints of nausea, vomiting, diarrhea, abdominal pain after presumed food poisoning. For details see H&P note.  Patient was treated with IV fluids, antiemetics for gastroenteritis and dehydration. Symptoms resolved, including nausea, vomiting, diarrhea, abdominal pain. Patient started on clear liquid diet and advanced to GI soft diet. Hyponatremia resolved. Low potassium replaced.    The patient met 2-midnight criteria for an inpatient stay at the time of discharge.    Therefore, she is discharged in good and stable condition with close outpatient follow-up.    SPECIFIC OUTPATIENT FOLLOW-UP  pcp    DISCHARGE PROBLEM LIST  Active Problems:    Diarrhea POA: Yes    Vomiting POA: Yes    Dehydration POA: Yes    Hyponatremia POA: Yes    Gastroenteritis POA: Unknown  Resolved Problems:    * No resolved hospital problems. *      FOLLOW UP  Future Appointments  Date Time Provider Department Center   3/20/2018 2:00 PM CARE MANAGER [facility] JOS[facility]   3/23/2018 2:20 PM JENIFER Sher [facility]   3/30/2018 10:40 AM Opal Delnaey M.D. 77M[facility] SARAYDENA Delaney M.D.  [address]  [facility] [address]  [facility] [address]  [phone]      as needed      MEDICATIONS ON DISCHARGE   Court Cortes   Home Medication Instructions YANNICK:[id number]    Printed on:03/18/18 1205   Medication Information                      Nutritional Supplements (VITAMIN D MAINTENANCE PO)  Take  by mouth.             Probiotic Product (PROBIOTIC DAILY PO)  Take  by mouth.                 DIET  Orders Placed This Encounter   Procedures   • DIET ORDER     Standing Status:   Standing     Number of Occurrences:   1     Order Specific Question:   Diet:     Answer:   Low Fiber(GI Soft) [2]       ACTIVITY  As  tolerated.      CONSULTATIONS  none    PROCEDURES  none    LABORATORY  Lab Results   Component Value Date/Time    SODIUM 140 03/18/2018 04:49 AM    POTASSIUM 3.3 (L) 03/18/2018 04:49 AM    CHLORIDE 110 03/18/2018 04:49 AM    CO2 23 03/18/2018 04:49 AM    GLUCOSE 70 03/18/2018 04:49 AM    BUN 8 03/18/2018 04:49 AM    CREATININE 0.71 03/18/2018 04:49 AM        Lab Results   Component Value Date/Time    WBC 2.7 (L) 03/18/2018 04:49 AM    HEMOGLOBIN 11.6 (L) 03/18/2018 04:49 AM    HEMATOCRIT 35.5 (L) 03/18/2018 04:49 AM    PLATELETCT 244 03/18/2018 04:49 AM      No orders to display         Total time of the discharge process exceeds 45 minutes

## 2018-03-18 NOTE — PROGRESS NOTES
Received report resting in bed alert and oriented x4,denies any pain,no diarrhea reported.Discussed POC and verbalized understanding.Instructed to call for any needs call light with in reach.Anxious to go home.

## 2018-03-18 NOTE — PROGRESS NOTES
Up ad kevin in the room,tolerated diet and voiding adequate amounts.Discharge instructions given and verbalized understanding.Will facilitate discharge via wheelchair.

## 2018-04-13 ENCOUNTER — OFFICE VISIT (OUTPATIENT)
Dept: MEDICAL GROUP | Facility: MEDICAL CENTER | Age: 78
End: 2018-04-13
Payer: MEDICARE

## 2018-04-13 VITALS
OXYGEN SATURATION: 96 % | BODY MASS INDEX: 17.94 KG/M2 | TEMPERATURE: 98.6 F | SYSTOLIC BLOOD PRESSURE: 112 MMHG | HEART RATE: 78 BPM | WEIGHT: 89 LBS | RESPIRATION RATE: 14 BRPM | HEIGHT: 59 IN | DIASTOLIC BLOOD PRESSURE: 62 MMHG

## 2018-04-13 DIAGNOSIS — K52.9 GASTROENTERITIS: ICD-10-CM

## 2018-04-13 DIAGNOSIS — E87.6 HYPOKALEMIA: ICD-10-CM

## 2018-04-13 DIAGNOSIS — R55 SYNCOPE, UNSPECIFIED SYNCOPE TYPE: ICD-10-CM

## 2018-04-13 PROBLEM — R11.10 VOMITING: Status: RESOLVED | Noted: 2018-03-16 | Resolved: 2018-04-13

## 2018-04-13 PROBLEM — E86.0 DEHYDRATION: Status: RESOLVED | Noted: 2018-03-16 | Resolved: 2018-04-13

## 2018-04-13 PROBLEM — E87.1 HYPONATREMIA: Status: RESOLVED | Noted: 2018-03-16 | Resolved: 2018-04-13

## 2018-04-13 PROBLEM — R19.7 DIARRHEA: Status: RESOLVED | Noted: 2018-03-16 | Resolved: 2018-04-13

## 2018-04-13 PROCEDURE — 99214 OFFICE O/P EST MOD 30 MIN: CPT | Performed by: FAMILY MEDICINE

## 2018-04-13 PROCEDURE — 93000 ELECTROCARDIOGRAM COMPLETE: CPT | Performed by: FAMILY MEDICINE

## 2018-04-13 RX ORDER — UBIDECARENONE 75 MG
100 CAPSULE ORAL DAILY
COMMUNITY
End: 2019-07-15

## 2018-04-13 NOTE — PROGRESS NOTES
"cc: Syncopal episodes    Subjective:     Court Cortes is a 77 y.o. female presenting to discuss recurrent syncopal episodes. In 2015, she was admitted to [facility] after a syncopal episode. It was thought to be secondary to dehydration as she was working out at the gym heavily. She did develop a subarachnoid hemorrhage, but this was thought to be secondary to her fall when she passed out. She reports another episode of syncope one summer when she was running around at the park, does not remember the exact time. Paramedics showed up, but she declined to go the hospital. The third time she passed out, she was going to the bathroom and had passed out on her commode. She also does not remember approximately when this occurred. Last month, she had an episode of gastroenteritis, was in the hospital for a couple days as she was quite dehydrated. Approximately 2 weeks after discharge, she was having lunch with friends when she suddenly felt nauseated, somewhat lightheaded, and felt the need to defecate. She then passed out for a couple seconds, her friends poured some water over her face and she was able to wake up. Several days later, she had an episode of nausea, chills, rigors, lightheadedness, but this seemed to resolve after she ate a lot of sugar and drink a lot of water. Since then, she has been drinking 2 60 ounce water bottles every day. Currently feels well. Denies any headaches, chest pain, shortness breath, lightheadedness, dizziness, abdominal pain, diarrhea, bladder symptoms, diarrhea, constipation. She reports that before her syncopal episodes, she has never felt palpitations, chest pain, shortness of breath. Nobody has reported to her that she had seizure-like movements or muscle twitching. She has not been incontinent of bowel or bladder during these episodes. After these episodes, she reports that her head feels somewhat \"heavy\", but does not feel confused, is able to function and communicate " "well    Review of systems:  See above.      Current Outpatient Prescriptions:   •  cyanocobalamin (VITAMIN B-12) 100 MCG Tab, Take 100 mcg by mouth every day., Disp: , Rfl:   •  LUTEIN PO, Take  by mouth., Disp: , Rfl:   •  Probiotic Product (PROBIOTIC DAILY PO), Take  by mouth., Disp: , Rfl:   •  Nutritional Supplements (VITAMIN D MAINTENANCE PO), Take  by mouth., Disp: , Rfl:     Allergies   Allergen Reactions   • Sulfamethoxazole-Trimethoprim [Bactrim Ds] Nausea       Past Medical History:   Diagnosis Date   • SAH (subarachnoid hemorrhage) ([facility]) 1/23/2015     Past Surgical History:   Procedure Laterality Date   • TONSILLECTOMY       Family History   Problem Relation Age of Onset   • Stroke Sister    • Hypertension Sister    • Stroke Brother    • Hypertension Brother      Social History     Social History   • Marital status: Single     Spouse name: N/A   • Number of children: N/A   • Years of education: N/A     Occupational History   • Not on file.     Social History Main Topics   • Smoking status: Former Smoker   • Smokeless tobacco: Never Used   • Alcohol use No   • Drug use: No   • Sexual activity: Not on file     Other Topics Concern   • Not on file     Social History Narrative   • No narrative on file       Objective:     Vitals: /62   Pulse 78   Temp 37 °C (98.6 °F)   Resp 14   Ht 1.499 m (4' 11.02\")   Wt 40.4 kg (89 lb)   SpO2 96%   BMI 17.97 kg/m²   General: Alert, pleasant, NAD  HEENT: Normocephalic.  PERRL, EOMI, no icterus or pallor.  Conjunctivae and lids normal. External ears normal.  Oropharynx non-erythematous, mucous membranes moist.  Neck supple.  No thyromegaly or masses palpated. No cervical or supraclavicular lymphadenopathy.  Heart: Regular rate and rhythm.  S1 and S2 normal.  No murmurs appreciated.  Respiratory: Normal respiratory effort.  Clear to auscultation bilaterally.  Abdomen: Non-distended, soft  Skin: Warm, dry, no rashes.  Musculoskeletal: Gait is normal.  Moves " all extremities well.  Extremities: No leg edema.   Psych:  Affect/mood is normal, judgement is good, memory is intact, grooming is appropriate.    Ekg: hr 75 bpm, sinus rhythm, normal axis, normal intervals, r wave progression; no st segment changes    Assessment/Plan:     Court was seen today for follow-up.    Diagnoses and all orders for this visit:    Syncope, unspecified syncope type  Possible vasovagal episodes, but we'll check the following labs, Holter monitor, carotid ultrasound, MRI of the brain. EKG was unremarkable today. Referral to neurology and cardiology placed  -     COMP METABOLIC PANEL; Future  -     CBC WITH DIFFERENTIAL; Future  -     TSH WITH REFLEX TO FT4; Future  -     HEMOGLOBIN A1C; Future  -     HOLTER MONITOR / EVENT RECORDER  -     US-CAROTID DOPPLER; Future  -     MR-BRAIN-WITH & W/O; Future  -     REFERRAL TO NEUROLOGY  -     REFERRAL TO CARDIOLOGY  -     EKG    Hypokalemia  Potassium level was low during her episode of gastroenteritis while in the hospital. We'll recheck  -     COMP METABOLIC PANEL; Future    Gastroenteritis  Resolved      Patient was seen for a total of 30 minutes face-to-face by myself, with more than half of the time spent counseling on syncopal episodes and coordinating care regarding the above mentioned problems.        Return if symptoms worsen or fail to improve.

## 2018-04-18 LAB
25(OH)D3+25(OH)D2 SERPL-MCNC: 41.5 NG/ML (ref 30–100)
ALBUMIN SERPL-MCNC: 4.3 G/DL (ref 3.5–4.8)
ALBUMIN/GLOB SERPL: 1.5 {RATIO} (ref 1.2–2.2)
ALP SERPL-CCNC: 111 IU/L (ref 39–117)
ALT SERPL-CCNC: 11 IU/L (ref 0–32)
AST SERPL-CCNC: 22 IU/L (ref 0–40)
BASOPHILS # BLD AUTO: 0 X10E3/UL (ref 0–0.2)
BASOPHILS NFR BLD AUTO: 1 %
BILIRUB SERPL-MCNC: 0.3 MG/DL (ref 0–1.2)
BUN SERPL-MCNC: 10 MG/DL (ref 8–27)
BUN/CREAT SERPL: 15 (ref 12–28)
CALCIUM SERPL-MCNC: 9.2 MG/DL (ref 8.7–10.3)
CHLORIDE SERPL-SCNC: 98 MMOL/L (ref 96–106)
CHOLEST SERPL-MCNC: 184 MG/DL (ref 100–199)
CO2 SERPL-SCNC: 23 MMOL/L (ref 18–29)
CREAT SERPL-MCNC: 0.66 MG/DL (ref 0.57–1)
CRP SERPL HS-MCNC: 0.19 MG/L (ref 0–3)
EOSINOPHIL # BLD AUTO: 0.1 X10E3/UL (ref 0–0.4)
EOSINOPHIL NFR BLD AUTO: 3 %
ERYTHROCYTE [DISTWIDTH] IN BLOOD BY AUTOMATED COUNT: 13.6 % (ref 12.3–15.4)
GFR SERPLBLD CREATININE-BSD FMLA CKD-EPI: 100 ML/MIN/1.73
GFR SERPLBLD CREATININE-BSD FMLA CKD-EPI: 87 ML/MIN/1.73
GLOBULIN SER CALC-MCNC: 2.9 G/DL (ref 1.5–4.5)
GLUCOSE SERPL-MCNC: 81 MG/DL (ref 65–99)
HBA1C MFR BLD: 5.7 % (ref 4.8–5.6)
HCT VFR BLD AUTO: 43.4 % (ref 34–46.6)
HDLC SERPL-MCNC: 73 MG/DL
HGB BLD-MCNC: 14.5 G/DL (ref 11.1–15.9)
IMM GRANULOCYTES # BLD: 0 X10E3/UL (ref 0–0.1)
IMM GRANULOCYTES NFR BLD: 0 %
IMMATURE CELLS  115398: ABNORMAL
LABORATORY COMMENT REPORT: NORMAL
LDLC SERPL CALC-MCNC: 99 MG/DL (ref 0–99)
LYMPHOCYTES # BLD AUTO: 1.1 X10E3/UL (ref 0.7–3.1)
LYMPHOCYTES NFR BLD AUTO: 34 %
MCH RBC QN AUTO: 31.7 PG (ref 26.6–33)
MCHC RBC AUTO-ENTMCNC: 33.4 G/DL (ref 31.5–35.7)
MCV RBC AUTO: 95 FL (ref 79–97)
MONOCYTES # BLD AUTO: 0.4 X10E3/UL (ref 0.1–0.9)
MONOCYTES NFR BLD AUTO: 13 %
MORPHOLOGY BLD-IMP: ABNORMAL
NEUTROPHILS # BLD AUTO: 1.6 X10E3/UL (ref 1.4–7)
NEUTROPHILS NFR BLD AUTO: 49 %
NRBC BLD AUTO-RTO: ABNORMAL %
PLATELET # BLD AUTO: 280 X10E3/UL (ref 150–379)
POTASSIUM SERPL-SCNC: 4.1 MMOL/L (ref 3.5–5.2)
PROT SERPL-MCNC: 7.2 G/DL (ref 6–8.5)
RBC # BLD AUTO: 4.58 X10E6/UL (ref 3.77–5.28)
SODIUM SERPL-SCNC: 136 MMOL/L (ref 134–144)
TRIGL SERPL-MCNC: 61 MG/DL (ref 0–149)
TSH SERPL DL<=0.005 MIU/L-ACNC: 1.83 UIU/ML (ref 0.45–4.5)
VLDLC SERPL CALC-MCNC: 12 MG/DL (ref 5–40)
WBC # BLD AUTO: 3.3 X10E3/UL (ref 3.4–10.8)

## 2018-04-21 ENCOUNTER — HOSPITAL ENCOUNTER (OUTPATIENT)
Facility: MEDICAL CENTER | Age: 78
End: 2018-04-21
Attending: FAMILY MEDICINE
Payer: MEDICARE

## 2018-04-21 PROCEDURE — 82274 ASSAY TEST FOR BLOOD FECAL: CPT

## 2018-04-26 DIAGNOSIS — Z12.11 SCREEN FOR COLON CANCER: ICD-10-CM

## 2018-04-26 LAB — HEMOCCULT STL QL IA: NEGATIVE

## 2018-05-02 ENCOUNTER — HOSPITAL ENCOUNTER (OUTPATIENT)
Dept: RADIOLOGY | Facility: MEDICAL CENTER | Age: 78
End: 2018-05-02
Attending: FAMILY MEDICINE
Payer: MEDICARE

## 2018-05-02 DIAGNOSIS — R55 SYNCOPE, UNSPECIFIED SYNCOPE TYPE: ICD-10-CM

## 2018-05-02 PROCEDURE — 93880 EXTRACRANIAL BILAT STUDY: CPT

## 2018-05-02 PROCEDURE — 700117 HCHG RX CONTRAST REV CODE 255: Performed by: FAMILY MEDICINE

## 2018-05-02 PROCEDURE — 70553 MRI BRAIN STEM W/O & W/DYE: CPT

## 2018-05-02 PROCEDURE — A9579 GAD-BASE MR CONTRAST NOS,1ML: HCPCS | Performed by: FAMILY MEDICINE

## 2018-05-02 RX ADMIN — GADODIAMIDE 9 ML: 287 INJECTION INTRAVENOUS at 09:03

## 2018-05-03 ENCOUNTER — NON-PROVIDER VISIT (OUTPATIENT)
Dept: CARDIOLOGY | Facility: MEDICAL CENTER | Age: 78
End: 2018-05-03
Attending: FAMILY MEDICINE
Payer: MEDICARE

## 2018-05-03 DIAGNOSIS — R55 SYNCOPE, UNSPECIFIED SYNCOPE TYPE: ICD-10-CM

## 2018-05-07 DIAGNOSIS — R55 SYNCOPE, UNSPECIFIED SYNCOPE TYPE: ICD-10-CM

## 2018-05-07 LAB — EKG IMPRESSION: NORMAL

## 2018-05-07 PROCEDURE — 93224 XTRNL ECG REC UP TO 48 HRS: CPT | Performed by: INTERNAL MEDICINE

## 2018-05-11 ENCOUNTER — TELEPHONE (OUTPATIENT)
Dept: CARDIOLOGY | Facility: MEDICAL CENTER | Age: 78
End: 2018-05-11

## 2018-05-11 NOTE — TELEPHONE ENCOUNTER
Physician Interpretation: Normal Holter       Electronically Signed On 5-7-2018 16:49:28 PDT by Evaristo Mccarthy MD     _________________________________    Attempted to contact patient, no answer. Left detailed message regarding above note.  Advised to call back with questions.

## 2018-07-17 ENCOUNTER — APPOINTMENT (OUTPATIENT)
Dept: NEUROLOGY | Facility: MEDICAL CENTER | Age: 78
End: 2018-07-17
Payer: MEDICARE

## 2018-07-31 ENCOUNTER — OFFICE VISIT (OUTPATIENT)
Dept: NEUROLOGY | Facility: MEDICAL CENTER | Age: 78
End: 2018-07-31
Payer: MEDICARE

## 2018-07-31 VITALS
WEIGHT: 88.5 LBS | TEMPERATURE: 96.4 F | DIASTOLIC BLOOD PRESSURE: 60 MMHG | RESPIRATION RATE: 14 BRPM | HEIGHT: 59 IN | SYSTOLIC BLOOD PRESSURE: 132 MMHG | BODY MASS INDEX: 17.84 KG/M2 | OXYGEN SATURATION: 96 % | HEART RATE: 88 BPM

## 2018-07-31 DIAGNOSIS — Z13.31 SCREENING FOR DEPRESSION: ICD-10-CM

## 2018-07-31 DIAGNOSIS — R55 SYNCOPE AND COLLAPSE: ICD-10-CM

## 2018-07-31 DIAGNOSIS — R56.9 SEIZURE (HCC): ICD-10-CM

## 2018-07-31 DIAGNOSIS — Z82.3 FAMILY HISTORY OF STROKE: ICD-10-CM

## 2018-07-31 DIAGNOSIS — R90.89 ABNORMAL FINDING ON MRI OF BRAIN: ICD-10-CM

## 2018-07-31 PROCEDURE — 99205 OFFICE O/P NEW HI 60 MIN: CPT | Performed by: PSYCHIATRY & NEUROLOGY

## 2018-07-31 NOTE — NON-PROVIDER
History of present illness:   Ms. Court Cortes is here in clinic with her significant other to establish care. She was referred by her PCP, Dr. Delaney, for possible seizures. She reports having syncope and collapse since 2014. She had 4 episodes up to date. In 2014, she states she was at the gym and she had sensation of nausea, dizziness, and urge to defecate. She then laid down on a bench, felt better then got up and she eventually passed out and hit the right side of her head on the floor. She was brought to the ER and she reports of confusion afterwards. She denies incontinence or tongue biting. Subsequent similar episodes with confusion occurred every year except in 2016. Last syncopal episode was in April this year. Her significant other reports that she didn't collapse or fall this time, her eyes were open during the episode, and was confused for 3-5 mins afterwards. Pt believes her syncope is due to heat and dehydration. She also reports that cold water can help regain her consciousness. She denies of any weakness in the extremities.     After the last episode, she felt that she needs to see a doctor. Her PCP did a thorough work-up including lab work, MRI brain, holter monitoring, EKG, echocardiogram, and US carotid duplex. All of which came back unremarkable. However, MRI brain was abnormal. Pt is wishing to get a full explanation of the result of her MRI and wants to know the possible cause of her syncope.    She is on Vit D supplementation 1000 iU daily.     She does not drink alcohol, smoke, or use recreational substances.    Mood is great. No suicidal or homicidal thoughts.    She is driving. No reported accidents.           Past medical history:  Past Medical History:   Diagnosis Date   • SAH (subarachnoid hemorrhage) (HCC) 1/23/2015        Past surgical history:     Past Surgical History:   Procedure Laterality Date   • TONSILLECTOMY       Family history:  Family History   Problem Relation Age  of Onset   • Stroke Sister    • Hypertension Sister    • Stroke Brother    • Hypertension Brother         Social history:  Social History     Social History   • Marital status: Single     Spouse name: N/A   • Number of children: N/A   • Years of education: N/A     Occupational History   • Not on file.     Social History Main Topics   • Smoking status: Former Smoker   • Smokeless tobacco: Never Used   • Alcohol use No   • Drug use: No   • Sexual activity: Not on file     Other Topics Concern   • Not on file     Social History Narrative   • No narrative on file        Current medications:     Current Outpatient Prescriptions on File Prior to Visit   Medication Sig Dispense Refill   • cyanocobalamin (VITAMIN B-12) 100 MCG Tab Take 100 mcg by mouth every day.     • LUTEIN PO Take  by mouth.     • Probiotic Product (PROBIOTIC DAILY PO) Take  by mouth.     • Nutritional Supplements (VITAMIN D MAINTENANCE PO) Take  by mouth.       No current facility-administered medications on file prior to visit.       Review of systems:  Constitutional: denies fever, night sweats, weight loss, or malaise/fatigue.  Eyes: denies acute vision change, eye pain or secretion.  Ears, Nose, Mouth, Throat: denies nasal secretion, nasal bleeding, difficulty swallowing, hearing loss, tinnitus, vertigo, ear pain, oral ulcers or lesions.  Endocrine: denies recent weight changes, heat or cold intolerance, polyuria, polydypsia, polyphagia,abnormal hair growth.  Cardiovascular: denies chest pain, palpitations, syncope, lower extremity edema, or dyspnea of exertion.  Pulmonary: denies shortness of breath, cough, hemoptysis, wheezing, chest pain or flu-like symptoms.  GI: denies nausea, vomiting, diarrhea, GI bleeding, change in appetite, abdominal pain, and change in bowel habits.  : denies urinary incontinence, retention or hematuria.  Heme/oncology: denies history of easy bruising or bleeding. No history of cancer.  Allergy/immunology: denies  "hives/urticarial.  Dermatologic: denies new rash.  Musculoskeletal:denies joint swelling or pain, muscle pain, neck and back pain.  Neurologic: denies headaches, acute visual changes, facial droopiness, muscle weakness (focal or generalized), paresthesias, anesthesia, ataxia, change in speech or language, memory loss, abnormal movements, seizures, + loss of consciousness, or episodes of confusion.  Psychiatric: denies symptoms of depression, anxiety, hallucinations, mood swings or changes, suicidal or homicidal thoughts.    Physical examination:  /60   Pulse 88   Temp (!) 35.8 °C (96.4 °F)   Resp 14   Ht 1.499 m (4' 11\")   Wt 40.1 kg (88 lb 8 oz)   SpO2 96%   BMI 17.87 kg/m²     General: Patient in no acute distress, pleasant and cooperative.  HEENT: Normocephalic, no signs of acute trauma.  Neck: FROM, supple, no meningeal signs or carotid bruits. Non-tenderness  Chest: clear to auscultation. No cough.  CV: RRR, no murmurs.  Abdomen: soft, non distended or tender upon palpation  Skin: no signs of acute rashes or trauma.  Musculoskeletal: joints exhibit full range of motion, without any pain to palpation. There are no signs of joint or muscle swelling. There is no tenderness to deep palpation of muscles.  Psychiatric: No hallucinatory behavior. Mood and affect appear normal on exam.     NEUROLOGICAL EXAM:   Mental status, orientation: Awake, alert and fully oriented.  Speech and language: speech is clear and fluent. The patient is able to name, repeat and comprehend.  Memory: There is intact recollection of recent and remote events.  Cranial nerve exam: Pupils are 3-4 mm bilaterally and equally reactive to light and accommodation. Visual fields are intact by confrontation. There is no nystagmus on primary or secondary gaze. Intact full EOM in all directions of gaze. Face appears symmetric. Sensation in the face is intact to light touch. Uvula is midline. Palate elevates symmetrically. Tongue is midline " and without any signs of tongue biting or fasciculations.Sternocleidomastoid muscles exhibit is normal strength bilaterally. Shoulder shrug is intact bilaterally.  Motor exam: Strength is 5/5 in all extremities. Tone is normal. No abnormal movements were seen on exam.  Sensory exam reveals normal sense of light touch, proprioception, vibration and pinprick in all extremities.  Deep tendon reflexes: brisk throughout. Plantar responses are flexor. There is no clonus.  Coordination: shows a normal finger-nose-finger. Normal rapidly alternating movements.  Gait: The patient was able to get up from seated position on first attempt without requiring assistance. Found to be steady when walking. Movements were fluid with normal arm swing. The patient was able to turn without difficulties or tendency to fall. Romberg exam unremarkable        ANCILLARY DATA REVIEWED:       Lab Data Review:   Reviewed recent labs on chart. Vit D therapeutic.      Records reviewed:    Imaging:   MRI BRAIN, 5/02/2018  1.  No acute abnormality.  2.  Multifocal T2 hyperintensities in the subcortical and periventricular white matter, brainstem and thalamus likely representing severe chronic microvascular ischemic disease.  3.  There is tiny focus of chronic microhemorrhage in the right frontal lobe.  4.  The previously seen subarachnoid hemorrhage in the right posterior temporal lobe is resolved.    US-Carotid Doppler, 5/02/2018  1.  There no atherosclerotic plaque.  2.  There is no evidence of carotid occlusion.  3. Vertebral arteries demonstrate antegrade flow.  4. Diameter reduction in the internal carotid arteries: none. There is no hemodynamically significant stenosis.    EEG:  N/a    ASSESSMENT AND PLAN:    Syncope and collapse    Screening for depression    Seizure (HCC)      CLINICAL DISCUSSION:  Cause of syncope and collapse is unknown. Cardiac work-up was unremarkable.   Pt's presenting symptoms can possibly be a focal seizure with loss  of awareness. She has auras (naseous sensation and dizziness) prior to the spells and she passes out with reports of confusion afterwards.     MRI brain reviewed with pt and significant other (S.O.). She has significant degree of encephalomalacia on the subcortical regions and temporal lobes which is not age related. No hx of hypertension, stroke, or small vessel disease. She denies DM, hgA1c is mildly elevated, however. She does have family hx of cardiovascular disease. Discussed that these structural malformations can possibly contribute to her having a possible seizure and memory loss/ demetia in the future. Pt  And S.O. do not believe she is having memory issues. Denies sx of muscle weakness, unsteady gait, and visual disturbances.     Discussed possibility of 24hr EEG and maybe an elective 5 day stay at the hospital for epilepsy monitoring to hopefully capture a spell and figure out the etiology of her syncope and collapse. Pt refused and wants to wait for now. She will continue with rehydration and exercise as she believes that dehydration is the main reason for the syncope. She will consider having testing done if she has another episode in the future.     Reviewed labs and imaging. She continues with her Vit D supplementation. Vit D level is therapeutic.      She continues to drive. Last episode was more than 3 months. She is aware of driving cessation for at least 3 months once syncopal episode occurs again as per [place] law.    She will continue to f/u with her PCP.         FOLLOW-UP:   F/u as needed.      EDUCATION AND COUNSELING:  -Education was provided to the patient and/or family regarding diagnosis and prognosis. The chronic and unpredictable nature of the condition were discussed. There is increased risk for additional events, which may carry potential for significant injuries and death. Discussed frequent seizure triggers: sleep deprivation, medication non-compliance, use of illegal drugs/alcohol,  stress, and others.  -We reviewed in detail the current antiepileptic regimen. Potential side effects of antiepileptics were discussed at length, including but no limited to: hypersensitivity reactions (rash and others, some of which can be fatal), visual field changes (some of which may be irreversible), glaucoma, diplopia, kidney stones, osteopenia/osteoporosis/bone fractures, hyperthermia/anhydrosis, hyponatremia, tremors/abnormal movements, ataxia, dizziness, fatigue, increased risk for falls, risk for cardiac arrhythmias/syncope, gastrointestinal side effects(hepatitis, pancreatitis, gastritis, ulcers), gingival hypertrophy/bleeding, drowsiness, sedation, anxiety/nervousness, increased risk for suicide, increased risk for depression, and psychosis.  -We also reviewed drug-drug interactions and their potential effect on seizure control and medication side effects.   -Recommend chronic vitamin D supplementation and regular exercise (if not contraindicated).  -Patient/family educated on risk for SUDEP (Sudden Death in Epilepsy). Counseling was provided on the importance of strict medication and follow up compliance. The patient/family understand the risks associated with non-adherence with the medical plan as outlined, including but not limited to an increased risk for breakthrough seizures, which may contribute to injuries, disability, status epilepticus, and even death.  -Counseling was also provided on potential effects of alcohol and other drugs, which may lower seizure threshold and/or affect the metabolism of antiepileptic drugs. We recommend avoidance of alcohol and illegal drugs.  -Avoid sleep deprivation.  -We extensively discussed the aspects related to safety in drivers who suffer from epilepsy. The patient is encourage to report to the Division of Motor Vehicles of any condition and/or spells related to confusion, disorientation, and/or loss of awareness and/or loss of consciousness; as these may pose  a safety issue if they occur while operating a motor vehicle. The patient and/or family are ultimately responsible for exercising caution and abiding to regulations in place.  -Other seizure precautions were discussed at length, including no diving, no skydiving, no climbing or exposure to unprotected heights, no unsupervised swimming, no Jacuzzi or bathing in bathtubs or deep bodies of water. The patient/family have been advised about risks for operating any machinery while suffering from seizures / syncope / epilepsy and/or while taking antiepileptic drugs.     Patient/family agree with plan, as outlined.

## 2018-08-07 NOTE — PROGRESS NOTES
Chief Complaint   Patient presents with   • New Patient     syncope       Problem List Items Addressed This Visit     None      Visit Diagnoses     Syncope and collapse        Screening for depression        Seizure (HCC)        Abnormal finding on MRI of brain        Family history of stroke              History of present illness:   Ms. Court Cortes is here in clinic with her significant other to establish care. She was referred by her PCP, Dr. Delaney, for possible seizures. She reports having syncope and collapse since 2014. She had 4 episodes up to date. In 2014, she states she was at the gym and she had sensation of nausea, dizziness, and urge to defecate. She then laid down on a bench, felt better then got up and she eventually passed out and hit the right side of her head on the floor. She was brought to the ER and she reports of confusion afterwards. She denies incontinence or tongue biting. Subsequent similar episodes with confusion occurred every year except in 2016. Last syncopal episode was in April this year. Her significant other reports that she didn't collapse or fall this time, her eyes were open during the episode, and was confused for 3-5 mins afterwards. Pt believes her syncope is due to heat and dehydration. She also reports that cold water can help regain her consciousness. She denies of any weakness in the extremities.      After the last episode, she felt that she needs to see a doctor. Her PCP did a thorough work-up including lab work, MRI brain, holter monitoring, EKG, echocardiogram, and US carotid duplex. All of which came back unremarkable. However, MRI brain was abnormal. Pt is wishing to get a full explanation of the result of her MRI and wants to know the possible cause of her syncope.     She is on Vit D supplementation 1000 iU daily.      She does not drink alcohol, smoke, or use recreational substances.     Mood is great. No suicidal or homicidal thoughts.     She is  driving. No reported accidents.       Past medical history:   Past Medical History:   Diagnosis Date   • SAH (subarachnoid hemorrhage) (HCC) 1/23/2015       Past surgical history:   Past Surgical History:   Procedure Laterality Date   • TONSILLECTOMY         Family history:   Family History   Problem Relation Age of Onset   • Stroke Sister    • Hypertension Sister    • Stroke Brother    • Hypertension Brother        Social history:   Social History     Social History   • Marital status: Single     Spouse name: N/A   • Number of children: N/A   • Years of education: N/A     Occupational History   • Not on file.     Social History Main Topics   • Smoking status: Former Smoker   • Smokeless tobacco: Never Used   • Alcohol use No   • Drug use: No   • Sexual activity: Not on file     Other Topics Concern   • Not on file     Social History Narrative   • No narrative on file       Current medications:   Current Outpatient Prescriptions   Medication   • cyanocobalamin (VITAMIN B-12) 100 MCG Tab   • LUTEIN PO   • Probiotic Product (PROBIOTIC DAILY PO)   • Nutritional Supplements (VITAMIN D MAINTENANCE PO)     No current facility-administered medications for this visit.        Medication Allergy:  Allergies   Allergen Reactions   • Sulfamethoxazole-Trimethoprim [Bactrim Ds] Nausea         Review of systems:  Constitutional: denies fever, night sweats, weight loss, or malaise/fatigue.  Eyes: denies acute vision change, eye pain or secretion.  Ears, Nose, Mouth, Throat: denies nasal secretion, nasal bleeding, difficulty swallowing, hearing loss, tinnitus, vertigo, ear pain, oral ulcers or lesions.  Endocrine: denies recent weight changes, heat or cold intolerance, polyuria, polydypsia, polyphagia,abnormal hair growth.  Cardiovascular: denies chest pain, palpitations, syncope, lower extremity edema, or dyspnea of exertion.  Pulmonary: denies shortness of breath, cough, hemoptysis, wheezing, chest pain or flu-like symptoms.  GI:  "denies nausea, vomiting, diarrhea, GI bleeding, change in appetite, abdominal pain, and change in bowel habits.  : denies urinary incontinence, retention or hematuria.  Heme/oncology: denies history of easy bruising or bleeding. No history of cancer.  Allergy/immunology: denies hives/urticarial.  Dermatologic: denies new rash.  Musculoskeletal:denies joint swelling or pain, muscle pain, neck and back pain.  Neurologic: denies headaches, acute visual changes, facial droopiness, muscle weakness (focal or generalized), paresthesias, anesthesia, ataxia, change in speech or language, memory loss, abnormal movements, seizures, + loss of consciousness, or episodes of confusion.  Psychiatric: denies symptoms of depression, anxiety, hallucinations, mood swings or changes, suicidal or homicidal thoughts.    Physical examination:   Vitals:    07/31/18 1017   BP: 132/60   Pulse: 88   Resp: 14   Temp: (!) 35.8 °C (96.4 °F)   SpO2: 96%   Weight: 40.1 kg (88 lb 8 oz)   Height: 1.499 m (4' 11\")     General: Patient in no acute distress, pleasant and cooperative.  HEENT: Normocephalic, no signs of acute trauma.  Neck: FROM, supple, no meningeal signs or carotid bruits. Non-tenderness  Chest: clear to auscultation. No cough.  CV: RRR, no murmurs.  Abdomen: soft, non distended or tender upon palpation  Skin: no signs of acute rashes or trauma.  Musculoskeletal: joints exhibit full range of motion, without any pain to palpation. There are no signs of joint or muscle swelling. There is no tenderness to deep palpation of muscles.  Psychiatric: No hallucinatory behavior. Mood and affect appear normal on exam.     NEUROLOGICAL EXAM:   Mental status, orientation: Awake, alert and fully oriented.  Speech and language: speech is clear and fluent. The patient is able to name, repeat and comprehend.  Memory: There is intact recollection of recent and remote events.  Cranial nerve exam: Pupils are 3-4 mm bilaterally and equally reactive to " light and accommodation. Visual fields are intact by confrontation. There is no nystagmus on primary or secondary gaze. Intact full EOM in all directions of gaze. Face appears symmetric. Sensation in the face is intact to light touch. Uvula is midline. Palate elevates symmetrically. Tongue is midline and without any signs of tongue biting or fasciculations.Sternocleidomastoid muscles exhibit is normal strength bilaterally. Shoulder shrug is intact bilaterally.  Motor exam: Strength is 5/5 in all extremities. Tone is normal. No abnormal movements were seen on exam.  Sensory exam reveals normal sense of light touch, proprioception, vibration and pinprick in all extremities.  Deep tendon reflexes: brisk throughout. Plantar responses are flexor. There is no clonus.  Coordination: shows a normal finger-nose-finger. Normal rapidly alternating movements.  Gait: The patient was able to get up from seated position on first attempt without requiring assistance. Found to be steady when walking. Movements were fluid with normal arm swing. The patient was able to turn without difficulties or tendency to fall. Romberg exam unremarkable        ANCILLARY DATA REVIEWED:        Lab Data Review:   Reviewed recent labs on chart. Vit D therapeutic.      Records reviewed:     Imaging:   MRI BRAIN, 5/02/2018  1.  No acute abnormality.  2.  Multifocal T2 hyperintensities in the subcortical and periventricular white matter, brainstem and thalamus likely representing severe chronic microvascular ischemic disease.  3.  There is tiny focus of chronic microhemorrhage in the right frontal lobe.  4.  The previously seen subarachnoid hemorrhage in the right posterior temporal lobe is resolved.  (images personally reviewed and shown and reviewed at length with pt and family).      US-Carotid Doppler, 5/02/2018  1.  There no atherosclerotic plaque.  2.  There is no evidence of carotid occlusion.  3. Vertebral arteries demonstrate antegrade flow.  4.  Diameter reduction in the internal carotid arteries: none. There is no hemodynamically significant stenosis.     EEG:  N/a          ASSESSMENT AND PLAN:    1. Syncope and collapse      2. Screening for depression      3. Seizure (HCC)      4. Abnormal finding on MRI of brain      5. Family history of stroke        CLINICAL DISCUSSION:  Cause of syncope is unknown. Cardiac work-up was unremarkable.   Family member witnessed at least one event where she may have been confused afterwards.    MRI brain reviewed with pt and family. She has severe encephalomalacia on the subcortical regions and temporal lobes. There is no hx of traditional risk factors for microvascular disease: hypertension, stroke, DM. Her hgA1c is mildly elevated, however. She does have family hx of cardiovascular and cerebrovascular disease. She denies h/o migraines. There is no history of recurrent focal neurological episodes to suggest MS. Discussed that these structural changes increase her risk for seizures, memory loss, and changes in gait.      I recommend further neurological testing, including an amb 24hr EEG and maybe an elective 5 day stay at the hospital for veeg / epilepsy monitoring to hopefully capture a spell and rule out seizures. But, patient refused any further testing, despite extensive discussion about risk and lack of an etiology for her spells.      Reviewed labs and imaging. She continues with her Vit D supplementation. Vit D level was normal.        She continues to drive. Last episode was more than 3 months. She is aware of driving cessation for at least 3 months if episodesoccurs again as per [place] law.     She will continue to f/u with her PCP. Her intention is to remain hydrated and avoid episodes this way.          FOLLOW-UP:   Return if symptoms worsen or fail to improve.      EDUCATION AND COUNSELING:  -Education was provided to the patient and/or family regarding diagnosis and prognosis. The chronic and unpredictable  nature of the condition were discussed. There is increased risk for additional events, which may carry potential for significant injuries and death. Discussed frequent seizure triggers: sleep deprivation, medication non-compliance, use of illegal drugs/alcohol, stress, and others.   -We extensively discussed the aspects related to safety in drivers who suffer from epilepsy, seizures or syncope. The patient is encourage to report to the Division of Motor Vehicles of any condition and/or spells related to confusion, disorientation, and/or loss of awareness and/or loss of consciousness; as these may pose a safety issue if they occur while operating a motor vehicle. The patient and/or family are ultimately responsible for exercising caution and abiding to regulations in place.   -Other seizure precautions were discussed at length, including no diving, no skydiving, no climbing or exposure to unprotected heights, no unsupervised swimming, no Jacuzzi or bathing in bathtubs or deep bodies of water. The patient/family have been advised about risks for operating any machinery while suffering from seizures / syncope / epilepsy and/or while taking antiepileptic drugs.   -Diet and exercise discussed.   -The patient understands and agrees that due to the complexity of his/her diagnosis, results of any testing and further recommendations will typically be discussed/made during a face to face encounter in my office. The patient and/or family further understands it is their responsibility to keep proper follow up.     Patient/family agree with plan, as outlined.         Samir Eugene MD   Epilepsy and Neurodiagnostics.   Clinical  of Neurology [facility].   Diplomate in Neurology, Epilepsy, and Electrodiagnostic Medicine.   Office: [phone]  Fax: [phone]    o BILLING DOCUMENTATION:   Counseling:  I spent greater than 50% time face-to-face time of a total of  65 mins visit. Over 50% of the time of the visit today was spent on counseling and or coordination of care wtih the patient/family, with greater than 50% of the total time discussing:   o Diagnostic results, impressions, and/or recommended diagnostic studies, and coordination of care.   o Prognosis.  o Treatment recommendations, including risks, benefits, & alternatives.  o Instructions for treatment/management and/or follow-up.  o Importance of compliance with chosen treatment/management options.  o Risk factor modification.   o Patient & family education.  o Provided business card and/or instructions for follow-up & emergencies.

## 2019-02-01 ENCOUNTER — OFFICE VISIT (OUTPATIENT)
Dept: MEDICAL GROUP | Facility: MEDICAL CENTER | Age: 79
End: 2019-02-01
Payer: MEDICARE

## 2019-02-01 VITALS
OXYGEN SATURATION: 96 % | TEMPERATURE: 98.4 F | BODY MASS INDEX: 18.14 KG/M2 | HEART RATE: 70 BPM | SYSTOLIC BLOOD PRESSURE: 120 MMHG | RESPIRATION RATE: 14 BRPM | HEIGHT: 59 IN | DIASTOLIC BLOOD PRESSURE: 70 MMHG | WEIGHT: 90 LBS

## 2019-02-01 DIAGNOSIS — L73.9 FOLLICULITIS: ICD-10-CM

## 2019-02-01 PROCEDURE — 99213 OFFICE O/P EST LOW 20 MIN: CPT | Performed by: FAMILY MEDICINE

## 2019-02-01 ASSESSMENT — PATIENT HEALTH QUESTIONNAIRE - PHQ9: CLINICAL INTERPRETATION OF PHQ2 SCORE: 0

## 2019-02-01 NOTE — PROGRESS NOTES
"cc: Vaginal lumps    Subjective:     Court Cortes is a 78 y.o. female presenting to discuss 2 vaginal lumps that she noticed several months ago.  It is on the left genital area, has been stable.  Is quite small, otherwise asymptomatic.  Denies any pain, bleeding, pus, vaginal pain, vaginal discharge, bleeding, urinary symptoms.  Has tried nothing for this yet.  Denies any new soaps, lotions, detergents.    Review of systems:  See above.  Otherwise feels well, denies any chest pain, shortness of breath, lightheadedness, dizziness.  Stays well-hydrated.  Declines any preventative vaccines, screenings      Current Outpatient Prescriptions:   •  cyanocobalamin (VITAMIN B-12) 100 MCG Tab, Take 100 mcg by mouth every day., Disp: , Rfl:   •  LUTEIN PO, Take  by mouth., Disp: , Rfl:   •  Probiotic Product (PROBIOTIC DAILY PO), Take  by mouth., Disp: , Rfl:   •  Nutritional Supplements (VITAMIN D MAINTENANCE PO), Take  by mouth., Disp: , Rfl:     Allergies, past medical history, past surgical history, family history, social history reviewed and updated    Objective:     Vitals: /70 (BP Location: Right arm, Patient Position: Sitting)   Pulse 70   Temp 36.9 °C (98.4 °F) (Temporal)   Resp 14   Ht 1.499 m (4' 11.02\")   Wt 40.8 kg (90 lb)   SpO2 96%   BMI 18.17 kg/m²   General: Alert, pleasant, NAD  HEENT: Normocephalic.    Pelvic exam: Normal external genitalia including urethral meatus.  Well supported, nontender urethra and bladder.   No cervical motion tenderness.  Normal bimanual exam.  Two punctate papules with central opening on left labium majus, no erythema, swelling, tenderness  Rectal:  Normal external anus, small external hemorrhoid  Psych:  Affect/mood is normal, judgement is good, memory is intact, grooming is appropriate.    A chaperone was offered to the patient during today's exam. Chaperone name: Aye was present.        Assessment/Plan:     Court was seen today for annual " exam.    Diagnoses and all orders for this visit:    Folliculitis  Gave patient reassurance that she likely just has some blocked hair follicles or glands.  Recommended sitz baths, monitoring for now.      Return if symptoms worsen or fail to improve.

## 2019-04-18 ENCOUNTER — OFFICE VISIT (OUTPATIENT)
Dept: MEDICAL GROUP | Facility: MEDICAL CENTER | Age: 79
End: 2019-04-18
Payer: MEDICARE

## 2019-04-18 VITALS
TEMPERATURE: 98.6 F | HEIGHT: 59 IN | OXYGEN SATURATION: 95 % | HEART RATE: 76 BPM | RESPIRATION RATE: 16 BRPM | SYSTOLIC BLOOD PRESSURE: 116 MMHG | WEIGHT: 87 LBS | BODY MASS INDEX: 17.54 KG/M2 | DIASTOLIC BLOOD PRESSURE: 68 MMHG

## 2019-04-18 DIAGNOSIS — M81.0 AGE-RELATED OSTEOPOROSIS WITHOUT CURRENT PATHOLOGICAL FRACTURE: ICD-10-CM

## 2019-04-18 DIAGNOSIS — Z71.84 TRAVEL ADVICE ENCOUNTER: ICD-10-CM

## 2019-04-18 DIAGNOSIS — E78.5 HYPERLIPIDEMIA, UNSPECIFIED HYPERLIPIDEMIA TYPE: ICD-10-CM

## 2019-04-18 DIAGNOSIS — R73.03 PREDIABETES: ICD-10-CM

## 2019-04-18 DIAGNOSIS — Z00.00 MEDICARE ANNUAL WELLNESS VISIT, SUBSEQUENT: ICD-10-CM

## 2019-04-18 DIAGNOSIS — Z12.11 SCREEN FOR COLON CANCER: ICD-10-CM

## 2019-04-18 DIAGNOSIS — E55.9 VITAMIN D DEFICIENCY: ICD-10-CM

## 2019-04-18 DIAGNOSIS — J06.9 VIRAL URI WITH COUGH: ICD-10-CM

## 2019-04-18 PROCEDURE — 99213 OFFICE O/P EST LOW 20 MIN: CPT | Mod: 25 | Performed by: FAMILY MEDICINE

## 2019-04-18 PROCEDURE — G0439 PPPS, SUBSEQ VISIT: HCPCS | Performed by: FAMILY MEDICINE

## 2019-04-18 RX ORDER — BENZONATATE 100 MG/1
100 CAPSULE ORAL 3 TIMES DAILY PRN
Qty: 60 CAP | Refills: 0 | Status: SHIPPED | OUTPATIENT
Start: 2019-04-18 | End: 2019-07-15

## 2019-04-18 RX ORDER — AZITHROMYCIN 250 MG/1
TABLET, FILM COATED ORAL
Qty: 6 TAB | Refills: 0 | Status: SHIPPED
Start: 2019-04-18 | End: 2019-07-15

## 2019-04-18 ASSESSMENT — ENCOUNTER SYMPTOMS: GENERAL WELL-BEING: EXCELLENT

## 2019-04-18 ASSESSMENT — PATIENT HEALTH QUESTIONNAIRE - PHQ9: CLINICAL INTERPRETATION OF PHQ2 SCORE: 0

## 2019-04-18 ASSESSMENT — ACTIVITIES OF DAILY LIVING (ADL): BATHING_REQUIRES_ASSISTANCE: 0

## 2019-04-18 NOTE — PROGRESS NOTES
"cc: Cough, wellness    Subjective:     Court Cortes is a 78 y.o. female presenting to discuss of cough.  Started 10 days ago.  Associated with a headache, voice loss, nasal drainage.  Symptoms have overall improved.  Has been using essential oils and homeopathic remedies at home.  Denies any fevers, nausea, vomiting, sore throat, ear pain, chest pain, shortness of breath, abdominal pain, diarrhea, blood in the urine, recent travel, sick contacts.  She also reports that she will be traveling to [place] next week, is wondering about antibiotics in case she gets sick      Review of systems:  See above.       Current Outpatient Prescriptions:   •  benzonatate (TESSALON) 100 MG Cap, Take 1 Cap by mouth 3 times a day as needed for Cough., Disp: 60 Cap, Rfl: 0  •  azithromycin (ZITHROMAX) 250 MG Tab, Take 2 tabs on day 1, then take 1 tab on days 2-5, Disp: 6 Tab, Rfl: 0  •  cyanocobalamin (VITAMIN B-12) 100 MCG Tab, Take 100 mcg by mouth every day., Disp: , Rfl:   •  LUTEIN PO, Take  by mouth., Disp: , Rfl:   •  Probiotic Product (PROBIOTIC DAILY PO), Take  by mouth., Disp: , Rfl:   •  Nutritional Supplements (VITAMIN D MAINTENANCE PO), Take  by mouth., Disp: , Rfl:     Allergies, past medical history, past surgical history, family history, social history reviewed and updated    Objective:     Vitals: /68 (BP Location: Right arm, Patient Position: Sitting)   Pulse 76   Temp 37 °C (98.6 °F) (Temporal)   Resp 16   Ht 1.499 m (4' 11.02\")   Wt 39.5 kg (87 lb)   SpO2 95%   BMI 17.56 kg/m²   General: Alert, pleasant, NAD  HEENT: Normocephalic.  EOMI, no icterus or pallor.  Conjunctivae and lids normal. External ears normal. Tympanic membranes pearly, opaque bilaterally.  Nares patent, mucosa pink, drainage present. Oropharynx non-erythematous, mucous membranes moist.  Neck supple.  No thyromegaly or masses palpated. No cervical or supraclavicular lymphadenopathy.  Heart: Regular rate and rhythm.  S1 and S2 " normal.  No murmurs appreciated.  Respiratory: Normal respiratory effort.  Clear to auscultation bilaterally.  Abdomen: Non-distended, soft  Skin: Warm, dry, no rashes.  Musculoskeletal: Gait is normal.  Moves all extremities well.  Extremities: No leg edema.  Psych:  Affect/mood is normal, judgement is good, memory is intact, grooming is appropriate.    Assessment/Plan:     Court was seen today for cough.    Diagnoses and all orders for this visit:    Viral URI with cough  Discussed likely viral URI, recommended conservative management with rest, fluids, otc meds, hot water and honey, nasal saline rinses.  She can also try some benzonatate capsules.        -     benzonatate (TESSALON) 100 MG Cap; Take 1 Cap by mouth 3 times a day as needed for Cough.    Travel advice encounter  Prescription for azithromycin given to patient.  Discussed signs and symptoms watch for, reasons to start the medication.  -     azithromycin (ZITHROMAX) 250 MG Tab; Take 2 tabs on day 1, then take 1 tab on days 2-5      Return if symptoms worsen or fail to improve.

## 2019-04-18 NOTE — PROGRESS NOTES
Cc: cough, wellness    HPI:  Court Cortes is a 78 y.o. here for Medicare Annual Wellness Visit     Patient Active Problem List    Diagnosis Date Noted   • Age-related osteoporosis without current pathological fracture 02/13/2018   • Vitamin D deficiency 02/13/2018   • Hyperlipidemia 02/13/2018       Current Outpatient Prescriptions   Medication Sig Dispense Refill   • benzonatate (TESSALON) 100 MG Cap Take 1 Cap by mouth 3 times a day as needed for Cough. 60 Cap 0   • azithromycin (ZITHROMAX) 250 MG Tab Take 2 tabs on day 1, then take 1 tab on days 2-5 6 Tab 0   • cyanocobalamin (VITAMIN B-12) 100 MCG Tab Take 100 mcg by mouth every day.     • LUTEIN PO Take  by mouth.     • Probiotic Product (PROBIOTIC DAILY PO) Take  by mouth.     • Nutritional Supplements (VITAMIN D MAINTENANCE PO) Take  by mouth.       No current facility-administered medications for this visit.             Current supplements as per medication list.       Allergies: Sulfamethoxazole-trimethoprim [bactrim ds]    Current social contact/activities: reading, exercising, puzzles    She  reports that she has quit smoking. She has never used smokeless tobacco. She reports that she does not drink alcohol or use drugs.  Counseling given: Not Answered      DPA/Advanced Directive:  Patient has Advanced Directive, but it is not on file. Instructed to bring in a copy to scan into their chart.    ROS:    Gait: Uses no assistive device  Ostomy: No  Other tubes: No  Amputations: No  Chronic oxygen use: No  Last eye exam: last year, has an appt tomorrow  Wears hearing aids: No   : Denies any urinary leakage during the last 6 months    Screening:    Depression Screening    Little interest or pleasure in doing things?  0 - not at all  Feeling down, depressed , or hopeless? 0 - not at all  Patient Health Questionnaire Score: 0     If depressive symptoms identified deferred to follow up visit unless specifically addressed in assessment and  plan.    Interpretation of PHQ-9 Total Score   Score Severity   1-4 No Depression   5-9 Mild Depression   10-14 Moderate Depression   15-19 Moderately Severe Depression   20-27 Severe Depression    Screening for Cognitive Impairment    Three Minute Recall (village, kitchen, baby) 2/3    Will clock face with all 12 numbers and set the hands to show 10 past 10.  Yes 5/5  Cognitive concerns identified deferred for follow up unless specifically addressed in assessment and plan.    Fall Risk Assessment    Has the patient had two or more falls in the last year or any fall with injury in the last year?  No    Safety Assessment    Throw rugs on floor.  Yes  Handrails on all stairs.  Yes  Good lighting in all hallways.  Yes  Difficulty hearing.  No  Patient counseled about all safety risks that were identified.    Functional Assessment ADLs    Are there any barriers preventing you from cooking for yourself or meeting nutritional needs?  No.    Are there any barriers preventing you from driving safely or obtaining transportation?  No.    Are there any barriers preventing you from using a telephone or calling for help?  No.    Are there any barriers preventing you from shopping?  No.    Are there any barriers preventing you from taking care of your own finances?  No.    Are there any barriers preventing you from managing your medications?  No.    Are there any barriers preventing you from showering, bathing or dressing yourself?  No.    Are you currently engaging in any exercise or physical activity?  Yes.     What is your perception of your health?  Excellent.      Health Maintenance Summary                Annual Wellness Visit Overdue 1940     COLON CANCER SCREENING ANNUAL FIT Next Due 4/21/2019      Done 4/21/2018 OCCULT BLOOD FECES IMMUNOASSAY    IMM DTaP/Tdap/Td Vaccine Postponed 1/31/2020 Originally 9/11/1959. Insurance/Financial    IMM ZOSTER VACCINES Postponed 1/31/2020 Originally 9/11/1992. Insurance/Financial     "IMM PNEUMOCOCCAL 65+ (ADULT) LOW/MEDIUM RISK SERIES Postponed 2/1/2020 Originally 9/11/2007. Patient Refused    IMM INFLUENZA Postponed 2/1/2020 Originally 9/1/2019. Patient Refused          Patient Care Team:  Opal Delaney M.D. as PCP - General (Family Medicine)        Social History   Substance Use Topics   • Smoking status: Former Smoker   • Smokeless tobacco: Never Used   • Alcohol use No     Family History   Problem Relation Age of Onset   • Stroke Sister    • Hypertension Sister    • Stroke Brother    • Hypertension Brother      She  has a past medical history of SAH (subarachnoid hemorrhage) ([facility]) (1/23/2015).   Past Surgical History:   Procedure Laterality Date   • TONSILLECTOMY         Exam:   /68 (BP Location: Right arm, Patient Position: Sitting)   Pulse 76   Temp 37 °C (98.6 °F) (Temporal)   Resp 16   Ht 1.499 m (4' 11.02\")   Wt 39.5 kg (87 lb)   SpO2 95%  Body mass index is 17.56 kg/m².    Hearing excellent.    Dentition good  Alert, oriented in no acute distress.  Eye contact is good, speech goal directed, affect calm    Assessment and Plan. The following treatment and monitoring plan is recommended:    Court was seen today for cough.    Diagnoses and all orders for this visit:    Medicare annual wellness visit, subsequent  -     Initial Annual Wellness Visit - Includes PPPS ()    Viral URI with cough  New problem.  Recommended conservative management  -     benzonatate (TESSALON) 100 MG Cap; Take 1 Cap by mouth 3 times a day as needed for Cough.    Travel advice encounter  Discussed, prescription for azithromycin given  -     azithromycin (ZITHROMAX) 250 MG Tab; Take 2 tabs on day 1, then take 1 tab on days 2-5    Hyperlipidemia, unspecified hyperlipidemia type  Stable, continue to monitor.  -     CBC WITHOUT DIFFERENTIAL; Future  -     Comp Metabolic Panel; Future  -     Lipid Profile; Future  -     CRP QUANTITIVE (NON-CARDIAC); Future  -     Initial Annual Wellness Visit - " Includes PPPS ()    Age-related osteoporosis without current pathological fracture  Stable, continue to monitor.  -     CBC WITHOUT DIFFERENTIAL; Future  -     Comp Metabolic Panel; Future  -     VITAMIN D,25 HYDROXY; Future  -     Initial Annual Wellness Visit - Includes PPPS ()    Vitamin D deficiency  Stable, continue to monitor.  -     VITAMIN D,25 HYDROXY; Future  -     Initial Annual Wellness Visit - Includes PPPS ()    Prediabetes  Stable, continue to monitor.  -     HEMOGLOBIN A1C; Future  -     Initial Annual Wellness Visit - Includes PPPS ()    Screen for colon cancer  -     OCCULT BLOOD FECES IMMUNOASSAY (FIT); Future  -     Initial Annual Wellness Visit - Includes PPPS ()      Services suggested: No services needed at this time  Health Care Screening: Age-appropriate preventive services recommended by USPTF and ACIP covered by Medicare were discussed today. Services ordered if indicated and agreed upon by the patient.  Referrals offered: Community-based lifestyle interventions to reduce health risks and promote self-management and wellness, fall prevention, nutrition, physical activity, tobacco-use cessation, weight loss, and mental health services as per orders if indicated.    Discussion today about general wellness and lifestyle habits:    · Prevent falls and reduce trip hazards; Cautioned about securing or removing rugs.  · Have a working fire alarm and carbon monoxide detector;   · Engage in regular physical activity and social activities     Follow-up: Return if symptoms worsen or fail to improve.

## 2019-06-26 LAB
25(OH)D3+25(OH)D2 SERPL-MCNC: 40.7 NG/ML (ref 30–100)
ALBUMIN SERPL-MCNC: 4.5 G/DL (ref 3.5–4.8)
ALBUMIN/GLOB SERPL: 1.4 {RATIO} (ref 1.2–2.2)
ALP SERPL-CCNC: 110 IU/L (ref 39–117)
ALT SERPL-CCNC: 9 IU/L (ref 0–32)
AST SERPL-CCNC: 20 IU/L (ref 0–40)
BASOPHILS # BLD AUTO: 0 X10E3/UL (ref 0–0.2)
BASOPHILS NFR BLD AUTO: 0 %
BILIRUB SERPL-MCNC: 0.5 MG/DL (ref 0–1.2)
BUN SERPL-MCNC: 8 MG/DL (ref 8–27)
BUN/CREAT SERPL: 12 (ref 12–28)
CALCIUM SERPL-MCNC: 9.5 MG/DL (ref 8.7–10.3)
CHLORIDE SERPL-SCNC: 94 MMOL/L (ref 96–106)
CHOLEST SERPL-MCNC: 148 MG/DL (ref 100–199)
CO2 SERPL-SCNC: 19 MMOL/L (ref 20–29)
CREAT SERPL-MCNC: 0.68 MG/DL (ref 0.57–1)
CRP SERPL-MCNC: <1 MG/L (ref 0–10)
EOSINOPHIL # BLD AUTO: 0.1 X10E3/UL (ref 0–0.4)
EOSINOPHIL NFR BLD AUTO: 3 %
ERYTHROCYTE [DISTWIDTH] IN BLOOD BY AUTOMATED COUNT: 13.5 % (ref 12.3–15.4)
GLOBULIN SER CALC-MCNC: 3.2 G/DL (ref 1.5–4.5)
GLUCOSE SERPL-MCNC: 70 MG/DL (ref 65–99)
HBA1C MFR BLD: 5.7 % (ref 4.8–5.6)
HCT VFR BLD AUTO: 42.3 % (ref 34–46.6)
HDLC SERPL-MCNC: 65 MG/DL
HGB BLD-MCNC: 14.6 G/DL (ref 11.1–15.9)
IMM GRANULOCYTES # BLD AUTO: 0 X10E3/UL (ref 0–0.1)
IMM GRANULOCYTES NFR BLD AUTO: 0 %
IMMATURE CELLS  115398: ABNORMAL
LABORATORY COMMENT REPORT: NORMAL
LDLC SERPL CALC-MCNC: 68 MG/DL (ref 0–99)
LYMPHOCYTES # BLD AUTO: 1 X10E3/UL (ref 0.7–3.1)
LYMPHOCYTES NFR BLD AUTO: 33 %
MCH RBC QN AUTO: 31.4 PG (ref 26.6–33)
MCHC RBC AUTO-ENTMCNC: 34.5 G/DL (ref 31.5–35.7)
MCV RBC AUTO: 91 FL (ref 79–97)
MONOCYTES # BLD AUTO: 0.4 X10E3/UL (ref 0.1–0.9)
MONOCYTES NFR BLD AUTO: 13 %
MORPHOLOGY BLD-IMP: ABNORMAL
NEUTROPHILS # BLD AUTO: 1.6 X10E3/UL (ref 1.4–7)
NEUTROPHILS NFR BLD AUTO: 51 %
NRBC BLD AUTO-RTO: ABNORMAL %
PLATELET # BLD AUTO: 281 X10E3/UL (ref 150–450)
POTASSIUM SERPL-SCNC: 4.3 MMOL/L (ref 3.5–5.2)
PROT SERPL-MCNC: 7.7 G/DL (ref 6–8.5)
RBC # BLD AUTO: 4.65 X10E6/UL (ref 3.77–5.28)
SODIUM SERPL-SCNC: 132 MMOL/L (ref 134–144)
TRIGL SERPL-MCNC: 74 MG/DL (ref 0–149)
VLDLC SERPL CALC-MCNC: 15 MG/DL (ref 5–40)
WBC # BLD AUTO: 3.1 X10E3/UL (ref 3.4–10.8)

## 2019-07-15 ENCOUNTER — OFFICE VISIT (OUTPATIENT)
Dept: MEDICAL GROUP | Facility: MEDICAL CENTER | Age: 79
End: 2019-07-15
Payer: MEDICARE

## 2019-07-15 VITALS
OXYGEN SATURATION: 98 % | DIASTOLIC BLOOD PRESSURE: 70 MMHG | SYSTOLIC BLOOD PRESSURE: 108 MMHG | HEART RATE: 70 BPM | HEIGHT: 59 IN | BODY MASS INDEX: 16.93 KG/M2 | RESPIRATION RATE: 14 BRPM | TEMPERATURE: 98.6 F | WEIGHT: 84 LBS

## 2019-07-15 DIAGNOSIS — L30.9 DERMATITIS: ICD-10-CM

## 2019-07-15 DIAGNOSIS — L30.0 NUMMULAR ECZEMA: ICD-10-CM

## 2019-07-15 PROCEDURE — 99214 OFFICE O/P EST MOD 30 MIN: CPT | Performed by: FAMILY MEDICINE

## 2019-07-15 RX ORDER — TRIAMCINOLONE ACETONIDE 1 MG/G
CREAM TOPICAL
Qty: 30 G | Refills: 1 | Status: SHIPPED | OUTPATIENT
Start: 2019-07-15 | End: 2020-02-06

## 2019-07-15 RX ORDER — MULTIVIT WITH MINERALS/LUTEIN
TABLET ORAL
COMMUNITY
End: 2020-02-14

## 2019-07-15 NOTE — PROGRESS NOTES
"cc:  rash    Subjective:     Court Cortes is a 78 y.o. female presenting To discuss a rash.  Started about 4 weeks ago.  Is red, round, itchy, and was initially getting bigger.  She has lesions on her left abdomen, mid back.  She reports starting a bee pollen supplement just prior to the lesions, she has stopped these since.  Otherwise, denies any new soaps, lotions, detergents, medications, supplements.  She tried mupirocin cream, over-the-counter creams, essential oils with minimal improvement.      Review of systems:  See above.       Current Outpatient Prescriptions:   •  Calcium-Magnesium-Vitamin D 600-300-400 Liquid, Take  by mouth., Disp: , Rfl:   •  Ascorbic Acid (VITAMIN C) 1000 MG Tab, Take  by mouth., Disp: , Rfl:   •  triamcinolone acetonide (KENALOG) 0.1 % Cream, Apply thin layer to affected area twice a day as needed for rash, do not use longer than 14 days at a time., Disp: 30 g, Rfl: 1  •  LUTEIN PO, Take  by mouth., Disp: , Rfl:   •  Probiotic Product (PROBIOTIC DAILY PO), Take  by mouth., Disp: , Rfl:   •  Nutritional Supplements (VITAMIN D MAINTENANCE PO), Take  by mouth., Disp: , Rfl:     Allergies, past medical history, past surgical history, family history, social history reviewed and updated    Objective:     Vitals: /70 (BP Location: Left arm, Patient Position: Sitting)   Pulse 70   Temp 37 °C (98.6 °F) (Temporal)   Resp 14   Ht 1.499 m (4' 11.02\")   Wt 38.1 kg (84 lb)   SpO2 98%   BMI 16.96 kg/m²   General: Alert, pleasant, NAD  HEENT: Normocephalic.   Heart: Regular rate and rhythm.  S1 and S2 normal.  No murmurs appreciated.  Respiratory: Normal respiratory effort.  Clear to auscultation bilaterally.  Abdomen: Non-distended, soft  Skin: Warm, dry.  Several circular patches of various sizes with an slightly erythematous border and dry scale on the inside, scattered on her left abdomen and back, measuring about 1 to 2 cm in diameter  Musculoskeletal: Gait is normal.  Moves " all extremities well.  Extremities: No leg edema.  Psych:  Affect/mood is normal, judgement is good, memory is intact, grooming is appropriate.    Assessment/Plan:     Diagnoses and all orders for this visit:    Dermatitis  Nummular eczema  New problem.  We discussed an allergic reaction versus eczema.  We will try a course of triamcinolone cream.  She will let us know if symptoms do not improve or if they worsen.  Recommended that she stop the mupirocin cream.  -     triamcinolone acetonide (KENALOG) 0.1 % Cream; Apply thin layer to affected area twice a day as needed for rash, do not use longer than 14 days at a time.      Return if symptoms worsen or fail to improve.

## 2019-10-07 ENCOUNTER — OFFICE VISIT (OUTPATIENT)
Dept: MEDICAL GROUP | Facility: MEDICAL CENTER | Age: 79
End: 2019-10-07
Payer: MEDICARE

## 2019-10-07 VITALS
BODY MASS INDEX: 17.91 KG/M2 | OXYGEN SATURATION: 95 % | SYSTOLIC BLOOD PRESSURE: 122 MMHG | TEMPERATURE: 96.9 F | HEIGHT: 59 IN | HEART RATE: 75 BPM | DIASTOLIC BLOOD PRESSURE: 60 MMHG | WEIGHT: 88.85 LBS

## 2019-10-07 DIAGNOSIS — N64.9 BREAST LESION: ICD-10-CM

## 2019-10-07 PROCEDURE — 99213 OFFICE O/P EST LOW 20 MIN: CPT | Performed by: FAMILY MEDICINE

## 2019-10-07 SDOH — HEALTH STABILITY: MENTAL HEALTH: HOW OFTEN DO YOU HAVE A DRINK CONTAINING ALCOHOL?: NEVER

## 2019-10-07 SDOH — HEALTH STABILITY: MENTAL HEALTH: HOW OFTEN DO YOU HAVE 6 OR MORE DRINKS ON ONE OCCASION?: NEVER

## 2019-10-07 NOTE — PROGRESS NOTES
"cc:  Nipple lesion    Subjective:     Court Cortes is a 79 y.o. female presenting for a left nipple lesion.  She noticed a lesion underneath her left nipple about 2 months ago.  Has been stable.  The area is somewhat sore.  Symptoms are worse when her close rubs against it.  Denies any fevers, injuries, swelling, redness, breast masses.  No family history of breast cancer.  She has declined to do mammograms up until now      Review of systems:  See above.       Current Outpatient Medications:   •  Ascorbic Acid (VITAMIN C) 1000 MG Tab, Take  by mouth., Disp: , Rfl:   •  LUTEIN PO, Take  by mouth., Disp: , Rfl:   •  Probiotic Product (PROBIOTIC DAILY PO), Take  by mouth., Disp: , Rfl:   •  Nutritional Supplements (VITAMIN D MAINTENANCE PO), Take  by mouth., Disp: , Rfl:   •  triamcinolone acetonide (KENALOG) 0.1 % Cream, Apply thin layer to affected area twice a day as needed for rash, do not use longer than 14 days at a time. (Patient not taking: Reported on 10/7/2019), Disp: 30 g, Rfl: 1    Allergies, past medical history, past surgical history, family history, social history reviewed and updated    Objective:     Vitals: /60 (BP Location: Right arm, Patient Position: Sitting, BP Cuff Size: Adult)   Pulse 75   Temp 36.1 °C (96.9 °F) (Temporal)   Ht 1.499 m (4' 11\")   Wt 40.3 kg (88 lb 13.5 oz)   SpO2 95%   BMI 17.94 kg/m²   General: Alert, pleasant, NAD  HEENT: Normocephalic.    Breasts:  approx 2cm pigmented elevated lesion on the inferior areola on the left.  Symmetrical, nontender, without masses or nipple discharge.    Psych:  Affect/mood is normal, judgement is good, memory is intact, grooming is appropriate.    Assessment/Plan:     Court was seen today for pain.    Diagnoses and all orders for this visit:    Breast lesion  New problem.  We discussed checking a mammogram and an ultrasound to rule out malignancy.  -     US-BREAST LIMITED-LEFT; Future  -     MA-MAMMO DIAGNOSTIC BILAT W/NIMESH " W/CAD; Future

## 2019-11-04 ENCOUNTER — HOSPITAL ENCOUNTER (OUTPATIENT)
Dept: RADIOLOGY | Facility: MEDICAL CENTER | Age: 79
End: 2019-11-04
Attending: FAMILY MEDICINE
Payer: MEDICARE

## 2019-11-04 DIAGNOSIS — D48.5 NEOPLASM OF UNCERTAIN BEHAVIOR OF SKIN: ICD-10-CM

## 2019-11-04 DIAGNOSIS — N64.9 BREAST LESION: ICD-10-CM

## 2019-11-04 DIAGNOSIS — N64.9 NIPPLE LESION: ICD-10-CM

## 2019-11-04 PROCEDURE — 76642 ULTRASOUND BREAST LIMITED: CPT | Mod: LT

## 2019-11-04 PROCEDURE — G0279 TOMOSYNTHESIS, MAMMO: HCPCS

## 2020-01-13 ENCOUNTER — APPOINTMENT (RX ONLY)
Dept: URBAN - METROPOLITAN AREA CLINIC 4 | Facility: CLINIC | Age: 80
Setting detail: DERMATOLOGY
End: 2020-01-13

## 2020-01-13 DIAGNOSIS — L20.89 OTHER ATOPIC DERMATITIS: ICD-10-CM

## 2020-01-13 DIAGNOSIS — Z00.8 ENCOUNTER FOR OTHER GENERAL EXAMINATION: ICD-10-CM

## 2020-01-13 DIAGNOSIS — L81.4 OTHER MELANIN HYPERPIGMENTATION: ICD-10-CM

## 2020-01-13 DIAGNOSIS — D18.0 HEMANGIOMA: ICD-10-CM

## 2020-01-13 DIAGNOSIS — D22 MELANOCYTIC NEVI: ICD-10-CM

## 2020-01-13 DIAGNOSIS — L82.1 OTHER SEBORRHEIC KERATOSIS: ICD-10-CM

## 2020-01-13 PROBLEM — D22.5 MELANOCYTIC NEVI OF TRUNK: Status: ACTIVE | Noted: 2020-01-13

## 2020-01-13 PROBLEM — D18.01 HEMANGIOMA OF SKIN AND SUBCUTANEOUS TISSUE: Status: ACTIVE | Noted: 2020-01-13

## 2020-01-13 PROBLEM — L20.84 INTRINSIC (ALLERGIC) ECZEMA: Status: ACTIVE | Noted: 2020-01-13

## 2020-01-13 PROCEDURE — 99203 OFFICE O/P NEW LOW 30 MIN: CPT

## 2020-01-13 PROCEDURE — ? REFERRAL CORRESPONDENCE

## 2020-01-13 PROCEDURE — ? PRESCRIPTION

## 2020-01-13 PROCEDURE — ? DIAGNOSIS COMMENT

## 2020-01-13 PROCEDURE — ? COUNSELING

## 2020-01-13 RX ORDER — HYDROCORTISONE 25 MG/G
OINTMENT TOPICAL
Qty: 1 | Refills: 1 | Status: ERX | COMMUNITY
Start: 2020-01-13

## 2020-01-13 RX ADMIN — HYDROCORTISONE: 25 OINTMENT TOPICAL at 00:00

## 2020-01-13 ASSESSMENT — LOCATION DETAILED DESCRIPTION DERM
LOCATION DETAILED: RIGHT INFERIOR MUCOSAL LIP
LOCATION DETAILED: LEFT ULNAR DORSAL HAND
LOCATION DETAILED: PERIUMBILICAL SKIN
LOCATION DETAILED: RIGHT MEDIAL BREAST 4-5:00 REGION
LOCATION DETAILED: RIGHT MEDIAL SUPERIOR CHEST
LOCATION DETAILED: LEFT INFERIOR MEDIAL MALAR CHEEK
LOCATION DETAILED: RIGHT RADIAL DORSAL HAND
LOCATION DETAILED: EPIGASTRIC SKIN
LOCATION DETAILED: RIGHT SUPERIOR VERMILION LIP

## 2020-01-13 ASSESSMENT — LOCATION ZONE DERM
LOCATION ZONE: LIP
LOCATION ZONE: LIP
LOCATION ZONE: TRUNK
LOCATION ZONE: FACE
LOCATION ZONE: HAND

## 2020-01-13 ASSESSMENT — LOCATION SIMPLE DESCRIPTION DERM
LOCATION SIMPLE: LEFT CHEEK
LOCATION SIMPLE: CHEST
LOCATION SIMPLE: RIGHT HAND
LOCATION SIMPLE: RIGHT BREAST
LOCATION SIMPLE: LEFT HAND
LOCATION SIMPLE: RIGHT SUPERIOR LIP
LOCATION SIMPLE: ABDOMEN
LOCATION SIMPLE: RIGHT INFERIOR MUCOSAL LIP

## 2020-01-13 NOTE — HPI: FULL BODY SKIN EXAMINATION
How Severe Are Your Spot(S)?: mild
What Type Of Note Output Would You Prefer (Optional)?: Bullet Format
What Is The Reason For Today's Visit?: Full Body Skin Examination
What Is The Reason For Today's Visit? (Being Monitored For X): concerning skin lesions on an annual basis
Additional History: Did see PCP for rash on back and abdomen 6 months ago, treated with triamcinolone.

## 2020-02-06 ENCOUNTER — OFFICE VISIT (OUTPATIENT)
Dept: NEUROLOGY | Facility: MEDICAL CENTER | Age: 80
End: 2020-02-06
Payer: MEDICARE

## 2020-02-06 VITALS
BODY MASS INDEX: 18.02 KG/M2 | WEIGHT: 89.4 LBS | HEIGHT: 59 IN | DIASTOLIC BLOOD PRESSURE: 72 MMHG | SYSTOLIC BLOOD PRESSURE: 128 MMHG | OXYGEN SATURATION: 96 % | HEART RATE: 82 BPM | RESPIRATION RATE: 14 BRPM | TEMPERATURE: 95.5 F

## 2020-02-06 DIAGNOSIS — R55 VASOVAGAL SYNCOPE: ICD-10-CM

## 2020-02-06 DIAGNOSIS — Z13.31 SCREENING FOR DEPRESSION: ICD-10-CM

## 2020-02-06 DIAGNOSIS — R90.89 ABNORMAL FINDING ON MRI OF BRAIN: ICD-10-CM

## 2020-02-06 DIAGNOSIS — G40.109 LOCALIZATION-RELATED EPILEPSY (HCC): ICD-10-CM

## 2020-02-06 PROCEDURE — 99215 OFFICE O/P EST HI 40 MIN: CPT | Performed by: PSYCHIATRY & NEUROLOGY

## 2020-02-06 NOTE — PROGRESS NOTES
Chief Complaint   Patient presents with   • Follow-Up     Syncope and collapse       Problem List Items Addressed This Visit     None      Visit Diagnoses     Localization-related epilepsy (HCC)        Vasovagal syncope        Abnormal finding on MRI of brain        Relevant Orders    Patient identified as fall risk.  Appropriate orders and counseling given.    Screening for depression              Interim history:  Court Cortes returns in follow-up, with her .  I have previously seen the patient in 2018, I had recommended additional testing at that time, but she had left my office at that time refusing to obtain any further testing.  The patient had yet another spell, this time was in October 11 2019.  The spell was witnessed by the  who is present in the room.  The patient states that she has recollection of the event although she was unconscious or unaware for about 2 to 3 minutes.  She does not believe that she was confused afterwards however the  who is sitting next to the patient disagrees, as he witnessed the entire event.  He indicates that the patient did not fully lose tone, she was sitting in a chair at a local radio station, she had a glare in her eyes, and was staring and unresponsive, this lasted for about 2 to 3 minutes, she kind of revived after they put some icy water on her face, but the  noted that she was confused after coming back to herself for at least 1 more minute or longer.  The patient and the  started arguing that she did not believe so, but apparently at the end, she agreed that the  had witnessed the spell, and was providing accurate statements about it.  She has not had any convulsions or jerky-like activity.  She has not had any passing out spells.  She does not drive.  She denies having current symptoms of depression and or suicidal ideation.  She denies any other symptoms at this point.      Past medical history:   Past Medical  History:   Diagnosis Date   • SAH (subarachnoid hemorrhage) ([facility]) 2015       Past surgical history:   Past Surgical History:   Procedure Laterality Date   • TONSILLECTOMY         Family history:   Family History   Problem Relation Age of Onset   • Stroke Sister    • Hypertension Sister    • Stroke Brother    • Hypertension Brother        Social history:   Social History     Socioeconomic History   • Marital status: Single     Spouse name: Not on file   • Number of children: Not on file   • Years of education: Not on file   • Highest education level: Not on file   Occupational History   • Not on file   Social Needs   • Financial resource strain: Not on file   • Food insecurity:     Worry: Not on file     Inability: Not on file   • Transportation needs:     Medical: Not on file     Non-medical: Not on file   Tobacco Use   • Smoking status: Former Smoker     Packs/day: 0.25     Years: 7.00     Pack years: 1.75     Types: Cigarettes     Start date:      Last attempt to quit: 1974     Years since quittin.1   • Smokeless tobacco: Never Used   Substance and Sexual Activity   • Alcohol use: No     Frequency: Never     Binge frequency: Never   • Drug use: No   • Sexual activity: Never     Partners: Male   Lifestyle   • Physical activity:     Days per week: Not on file     Minutes per session: Not on file   • Stress: Not on file   Relationships   • Social connections:     Talks on phone: Not on file     Gets together: Not on file     Attends [employer] service: Not on file     Active member of club or organization: Not on file     Attends meetings of clubs or organizations: Not on file     Relationship status: Not on file   • Intimate partner violence:     Fear of current or ex partner: Not on file     Emotionally abused: Not on file     Physically abused: Not on file     Forced sexual activity: Not on file   Other Topics Concern   • Not on file   Social History Narrative   • Not on file       Current  "medications:   Current Outpatient Medications   Medication   • hydrocortisone 2.5 % Cream topical cream   • LUTEIN PO   • Probiotic Product (PROBIOTIC DAILY PO)   • Nutritional Supplements (VITAMIN D MAINTENANCE PO)   • Ascorbic Acid (VITAMIN C) 1000 MG Tab     No current facility-administered medications for this visit.        Medication Allergy:  Allergies   Allergen Reactions   • Sulfamethoxazole-Trimethoprim [Bactrim Ds] Nausea         Review of systems:   As reviewed in today's history.    Physical examination:   Vitals:    02/06/20 0944   BP: 128/72   BP Location: Left arm   Patient Position: Sitting   BP Cuff Size: Adult   Pulse: 82   Resp: 14   Temp: (!) 35.3 °C (95.5 °F)   TempSrc: Temporal   SpO2: 96%   Weight: 40.6 kg (89 lb 6.4 oz)   Height: 1.499 m (4' 11\")     General: Patient in no acute distress, pleasant and cooperative.  HEENT: Normocephalic, no signs of acute trauma.   Neck: supple, no meningeal signs or carotid bruits. There is normal range of motion. No tenderness on exam.   Chest: clear to auscultation. No cough.   CV: RRR, no murmurs.   Skin: no signs of acute rashes or trauma.   Musculoskeletal: joints exhibit full range of motion, without any pain to palpation. There are no signs of joint or muscle swelling. There is no tenderness to deep palpation of muscles.   Psychiatric: No hallucinatory behavior. Denies symptoms of depression or suicidal ideation. Mood and affect appear normal on exam.     NEUROLOGICAL EXAM:   Mental status, orientation: Awake, alert and fully oriented.   Speech and language: speech is clear and fluent. The patient is able to name, repeat and comprehend.   Memory: There is intact recollection of recent and remote events.   Cranial nerve exam: Pupils are 3-4 mm bilaterally and equally reactive to light and accommodation. Visual fields are intact by confrontation. There is no nystagmus on primary or secondary gaze. Intact full EOM in all directions of gaze. Face appears " symmetric. Sensation in the face is intact to light touch. Uvula is midline. Palate elevates symmetrically. Tongue is midline and without any signs of tongue biting or fasciculations.Sternocleidomastoid muscles exhibit is normal strength bilaterally. Shoulder shrug is intact bilaterally.   Motor exam: Strength is 5/5 in all extremities. Tone is normal. No abnormal movements were seen on exam.   Sensory exam reveals normal sense of light touch in all extremities.   Deep tendon reflexes:  2+ throughout. Plantar responses are flexor. There is no clonus.   Coordination: shows a normal finger-nose-finger. Normal rapidly alternating movements.   Gait: The patient was able to get up from seated position on first attempt without requiring assistance. Found to be steady when walking. Movements were fluid with normal arm swing. The patient was able to turn without difficulties or tendency to fall. Romberg exam unremarkable.        ANCILLARY DATA REVIEWED:       Lab Data Review:  Reviewed in chart.    Records reviewed:   Chart reviewed.    Imaging:   MRI brain with and without, 5/2/2018:  1.  No acute abnormality.  2.  Multifocal T2 hyperintensities in the subcortical and periventricular white matter, brainstem and thalamus likely representing severe chronic microvascular ischemic disease.  3.  There is tiny focus of chronic microhemorrhage in the right frontal lobe.  4.  The previously seen subarachnoid hemorrhage in the right posterior temporal lobe is resolved.      (I personally reviewed the images again with the patient and the , multiple abnormalities in the subcortical white matter's of both hemispheres).    Carotic ultrasound, 5/2/2018:  1.  There no atherosclerotic plaque.     2.  There is no evidence of carotid occlusion.     3. Vertebral arteries demonstrate antegrade flow.     4. Diameter reduction in the internal carotid arteries: none. There is no hemodynamically significant stenosis.         EEG:  None.          ASSESSMENT AND PLAN:    1. Localization-related epilepsy (HCC)      2. Vasovagal syncope      3. Abnormal finding on MRI of brain  - Patient identified as fall risk.  Appropriate orders and counseling given.    4. Screening for depression      CLINICAL DISCUSSION:   Several episodes of either passing out or staring spells.  Some may suggest vasovagal episodes (prodrome of nausea and dizziness).  At least 2 episodes with confusion, one in the past where she passed out, witnessed by the .  Most recent spell in October 2019, likely suggestive of a focal nonmotor seizure with changes in awareness.  Differential also includes near syncope or syncopal spell.  I continue to recommend additional testing, and we discussed extensively during the appointment obtaining either a routine EEG, and ambulatory EEG or an epilepsy monitoring unit admission.  The patient does not want to undergo any testing.  She does not want to be on any medications, of which would possibly discuss a trial of antiepileptic medication.  Giving her abnormal MRI of the brain, she may be at risk for seizures.  We discussed that if in fact the spells are seizures, she is a risk for additional spells, and my recommendation is that she takes antiepileptic medications, but she continues to decline.  We discussed the possible risk of seizures, including but not limited to injuries, respiratory failure, status epilepticus, and death.  The patient will continue to follow with her primary care physician, as she is not interested in any testing and or management of the spells at this point from a neurological standpoint.      FOLLOW-UP:   Return if symptoms worsen or fail to improve.      EDUCATION AND COUNSELING:  -Education was provided to the patient and/or family regarding diagnosis and prognosis. The chronic and unpredictable nature of the condition were discussed. There is increased risk for additional events, which may carry potential  for significant injuries and death. Discussed possible seizure triggers.   -Patient/family educated on risk for SUDEP (Sudden Death in Epilepsy). Counseling was provided on the importance of compliance with medical care.  The patient/family understand the risks associated with non-adherence with the medical plan as outlined, including but not limited to an increased risk for breakthrough seizures, which may contribute to injuries, disability, status epilepticus, and even death.   -Counseling was also provided on potential effects of alcohol and other drugs, which may lower seizure threshold and/or affect the metabolism of antiepileptic drugs. We recommend avoidance of alcohol and illegal drugs.  -Avoid sleep deprivation.   -We extensively discussed the aspects related to safety in drivers who suffer from epilepsy. The patient is encourage to report to the Division of Motor Vehicles of any condition and/or spells related to confusion, disorientation, and/or loss of awareness and/or loss of consciousness; as these may pose a safety issue if they occur while operating a motor vehicle. The patient and/or family are ultimately responsible for exercising caution and abiding to regulations in place.  She does not drive.  -Other seizure precautions were discussed.     Patient/family agree with plan, as outlined.         Samir Eugene MD   Epilepsy and Neurodiagnostics.   Clinical  of Neurology [facility].   Diplomate in Neurology, Epilepsy, and Electrodiagnostic Medicine.   Office: [phone]  Fax: [phone]      BILLING DOCUMENTATION:     I have performed physical exam and reviewed and updated ROS and plan today 2/6/2020. In review of that note, there are no new changes except as documented above.     Counseling:  I spent greater than 50% time face-to-face time of a total of 50 minutes visit. Over 50% of the time of the visit today was spent on  counseling and or coordination of care wtih the patient and/or family, with greater than 50% of the total discussing my assessment and plan as stated above.

## 2020-02-14 ENCOUNTER — OFFICE VISIT (OUTPATIENT)
Dept: MEDICAL GROUP | Facility: IMAGING CENTER | Age: 80
End: 2020-02-14
Payer: MEDICARE

## 2020-02-14 VITALS
OXYGEN SATURATION: 96 % | SYSTOLIC BLOOD PRESSURE: 110 MMHG | HEART RATE: 76 BPM | RESPIRATION RATE: 16 BRPM | WEIGHT: 86.6 LBS | DIASTOLIC BLOOD PRESSURE: 64 MMHG | TEMPERATURE: 98.4 F | HEIGHT: 59 IN | BODY MASS INDEX: 17.46 KG/M2

## 2020-02-14 DIAGNOSIS — M81.0 AGE-RELATED OSTEOPOROSIS WITHOUT CURRENT PATHOLOGICAL FRACTURE: ICD-10-CM

## 2020-02-14 DIAGNOSIS — R55 VASOVAGAL SYNCOPE: ICD-10-CM

## 2020-02-14 DIAGNOSIS — E87.1 HYPONATREMIA: ICD-10-CM

## 2020-02-14 PROCEDURE — 99214 OFFICE O/P EST MOD 30 MIN: CPT | Performed by: FAMILY MEDICINE

## 2020-02-14 ASSESSMENT — PATIENT HEALTH QUESTIONNAIRE - PHQ9: CLINICAL INTERPRETATION OF PHQ2 SCORE: 0

## 2020-02-14 NOTE — PROGRESS NOTES
Subjective:     CC:    Chief Complaint   Patient presents with   • Establish Care   • Faint     fainting issue   • Requesting Labs       HISTORY OF THE PRESENT ILLNESS: This pleasant 79 y.o. female is here to establish care and discuss fainting issues. His/her prior PCP was Rodger. She is changing doctors because she does not drive any more and she is walking distance from this office.     Has concerns about fainting issues jan 23, 2015 was her first episode. Has fainted 6 times since. Oct 11, 2019 last episode. This is why she stopped driving. She can have some dizziness and blurred vision before it happens though not every time. The first time it happened was after the gym. At times she can have a meal and then just lose consciousness.     Had holter, carotid imaging, went to cardio sent for mri that showed several white spots on the brain. Cardio suspected epilepsy. 3/6 times Jonathan her significant other was there when she passed out without seizure symptoms such as shaking biting of the tongue.  However she does have post ictal confusion per Jonathan.  Patient has not obtained an EEG because she is not going to take antiepileptic medication.     Allergies: Sulfamethoxazole-trimethoprim [bactrim ds]    Current Outpatient Medications Ordered in Epic   Medication Sig Dispense Refill   • NON SPECIFIED      • NON SPECIFIED      • LUTEIN PO Take  by mouth.     • Probiotic Product (PROBIOTIC DAILY PO) Take  by mouth.     • Nutritional Supplements (VITAMIN D MAINTENANCE PO) Take  by mouth.     • hydrocortisone 2.5 % Cream topical cream Apply  to affected area(s) 2 times a day.     • Ascorbic Acid (VITAMIN C) 1000 MG Tab Take  by mouth.       No current Epic-ordered facility-administered medications on file.        Past Medical History:   Diagnosis Date   • SAH (subarachnoid hemorrhage) ([facility]) 1/23/2015       Past Surgical History:   Procedure Laterality Date   • TONSILLECTOMY         Social History     Tobacco Use   •  "Smoking status: Former Smoker     Packs/day: 0.25     Years: 7.00     Pack years: 1.75     Types: Cigarettes     Start date:      Last attempt to quit:      Years since quittin.1   • Smokeless tobacco: Never Used   Substance Use Topics   • Alcohol use: No     Frequency: Never     Binge frequency: Never   • Drug use: No       Social History     Social History Narrative   • Not on file       Family History   Problem Relation Age of Onset   • Stroke Sister    • Hypertension Sister    • Stroke Brother    • Hypertension Brother        ROS:   Gen: no fevers/chills, no changes in weight  Eyes: no changes in vision  ENT: no sore throat, no hearing loss  Pulm: no sob, no cough  CV: no chest pain, no palpitations  GI: no nausea/vomiting, no diarrhea  : no dysuria  MSk: no myalgias  Skin: no rash  Neuro: no headaches, no numbness/tingling  Heme/Lymph: no easy bruising      Objective:     Exam: /64 (BP Location: Left arm, Patient Position: Sitting, BP Cuff Size: Small adult)   Pulse 76   Temp 36.9 °C (98.4 °F) (Temporal)   Resp 16   Ht 1.499 m (4' 11\")   Wt 39.3 kg (86 lb 9.6 oz)   SpO2 96%  Body mass index is 17.49 kg/m².    General: Normal appearing. No distress.  HEENT: Normocephalic. Eyes conjunctiva clear lids without ptosis, eomi  Neck: Thyroid is not enlarged.  Pulmonary: Clear to ausculation.  Normal effort. No rales, ronchi, or wheezing.  Cardiovascular: Regular rate and rhythm without murmur. Carotid and radial pulses are intact and equal bilaterally.  Neurologic: No facial droop, normal gait, alert and oriented  Lymph: No cervical or supraclavicular lymph nodes are palpable  Skin: Warm and dry.  No obvious lesions.  Musculoskeletal: No extremity cyanosis, clubbing, or edema.  Psych: Normal mood and affect. Judgment and insight is normal.      Assessment & Plan:   79 y.o. female with the following -  After a very long discussion about her syncopal episodes and bone health patient is " requesting other labs I have asked the patient to return to office for her annual which she politely expresses that she does not feel is reasonable.  I have left it at that.  My recommendation is to return to office for additional concerns or for labs.  Reviewed old labs in detail  Problem List Items Addressed This Visit     Age-related osteoporosis without current pathological fracture     2-3 years of alendrontate she stopped due to concerns of potential side effects.   Discussed:  -Falls prevention  -Smoking cessation  -Moderate etoh us males no more than 4 at once, females no more than 2 at once  -Moderate caffeine intake.  1 unit equal to 1 cup of coffee or 2 cups of tea.  More than 2.5 units/day associated with increased risk of osteo-porosis  -Get 30 minutes of sunlight per day 5 days a week  -Vitamin D supplementation 1600 Iu/day             Hyponatremia     We will recheck this today and monitor         Relevant Orders    Comp Metabolic Panel    CBC WITH DIFFERENTIAL    Vasovagal syncope        Problem   Vasovagal Syncope    -It sounds like some of her episodes are from vasovagal over stimulation.  I explained that such as with eating a large meal that the vagal nerve can get overstimulated and causes syncope.  So if this is a trigger for her it would be helpful to eat smaller more frequent meals.  -In addition, I have talked about the possibility that this could be compounded with hypovolemia and or hyponatremia.  I have discussed appropriate water consumption for this altitude and her activity level as well as adding electrolyte tabs to her water every other day to maintain appropriate volume and sodium levels.  This might also help with her symptoms.  -I have encouraged her to get an EEG just to answer that question however patient does not want to go this route at this time.  I think it is reasonable to trial the above first.  -We will check sodium levels and hemoglobin     Hyponatremia   Age-Related  Osteoporosis Without Current Pathological Fracture     No follow-ups on file.  Patient was seen for 45 minutes face to face of which > 50% of appointment time was spent on counseling and coordination of care regarding the above.    Please note that this dictation was created using voice recognition software. I have made every reasonable attempt to correct obvious errors, but I expect that there are errors of grammar and possibly content that I did not discover before finalizing the note.

## 2020-02-14 NOTE — ASSESSMENT & PLAN NOTE
2-3 years of alendrontate she stopped due to concerns of potential side effects.   Discussed:  -Falls prevention  -Smoking cessation  -Moderate etoh us males no more than 4 at once, females no more than 2 at once  -Moderate caffeine intake.  1 unit equal to 1 cup of coffee or 2 cups of tea.  More than 2.5 units/day associated with increased risk of osteo-porosis  -Get 30 minutes of sunlight per day 5 days a week  -Vitamin D supplementation 1600 Iu/day

## 2020-02-14 NOTE — PATIENT INSTRUCTIONS
-Falls prevention   -Smoking cessation  -Moderate etoh us males no more than 4 at once, females no more than 2 at once  -Moderate caffeine intake.  1 unit equal to 1 cup of coffee or 2 cups of tea.  More than 2.5 units/day associated with increased risk of osteo-porosis  -Get 30 minutes of sunlight per day 5 days a week  -Vitamin D supplementation 800 Iu/day

## 2020-02-19 PROBLEM — R55 VASOVAGAL SYNCOPE: Status: ACTIVE | Noted: 2020-02-19

## 2020-11-02 ENCOUNTER — OFFICE VISIT (OUTPATIENT)
Dept: MEDICAL GROUP | Facility: IMAGING CENTER | Age: 80
End: 2020-11-02
Payer: MEDICARE

## 2020-11-02 VITALS
BODY MASS INDEX: 17.42 KG/M2 | HEART RATE: 91 BPM | RESPIRATION RATE: 14 BRPM | DIASTOLIC BLOOD PRESSURE: 76 MMHG | OXYGEN SATURATION: 97 % | TEMPERATURE: 97.9 F | HEIGHT: 59 IN | WEIGHT: 86.4 LBS | SYSTOLIC BLOOD PRESSURE: 118 MMHG

## 2020-11-02 DIAGNOSIS — R30.9 URINARY PAIN: ICD-10-CM

## 2020-11-02 DIAGNOSIS — B37.31 CANDIDIASIS OF VAGINA: ICD-10-CM

## 2020-11-02 LAB
APPEARANCE UR: CLEAR
BILIRUB UR STRIP-MCNC: NEGATIVE MG/DL
COLOR UR AUTO: NORMAL
GLUCOSE UR STRIP.AUTO-MCNC: NEGATIVE MG/DL
KETONES UR STRIP.AUTO-MCNC: NEGATIVE MG/DL
LEUKOCYTE ESTERASE UR QL STRIP.AUTO: NEGATIVE
NITRITE UR QL STRIP.AUTO: NEGATIVE
PH UR STRIP.AUTO: 7 [PH] (ref 5–8)
PROT UR QL STRIP: NEGATIVE MG/DL
RBC UR QL AUTO: NEGATIVE
SP GR UR STRIP.AUTO: 1.01
UROBILINOGEN UR STRIP-MCNC: 0.2 MG/DL

## 2020-11-02 PROCEDURE — 81002 URINALYSIS NONAUTO W/O SCOPE: CPT | Performed by: FAMILY MEDICINE

## 2020-11-02 PROCEDURE — 99213 OFFICE O/P EST LOW 20 MIN: CPT | Performed by: FAMILY MEDICINE

## 2020-11-02 ASSESSMENT — ENCOUNTER SYMPTOMS
SHORTNESS OF BREATH: 0
HEADACHES: 0
NAUSEA: 0
EYE PAIN: 0
DOUBLE VISION: 0
WHEEZING: 0
PALPITATIONS: 0
DIZZINESS: 0
COUGH: 0
FEVER: 0
CHILLS: 0
VOMITING: 0
MYALGIAS: 0
ABDOMINAL PAIN: 0
DIARRHEA: 0

## 2020-11-02 ASSESSMENT — FIBROSIS 4 INDEX: FIB4 SCORE: 1.85

## 2020-11-03 NOTE — PROGRESS NOTES
Chief Complaint   Patient presents with   • Urinary Pain     x 5-7    • Immunizations     questions regarding vaccines      HPI:  80-year-old female with history of hyponatremia hyperlipidemia osteoporosis here with concerns about 5 days of pain with urination.  She also reports that her vulva is red and hurts when she urinates.  And she wants to discuss flu and pneumococcal vaccines which she says she has never obtained.  No fevers chills nausea vomiting flank pain or polyuria.  She started using vaginal wipes that burned even worse. She says that it improved two days ago when she stopped using the wipes. Though the rash came before the wipes.   Allergies   Allergen Reactions   • Sulfamethoxazole-Trimethoprim [Bactrim Ds] Nausea     Current Outpatient Medications   Medication Sig Dispense Refill   • miconazole (MICOTIN) 2 % Cream Apply 1 Application to affected area(s) 2 times a day for 14 days. 35 g 1   • NON SPECIFIED      • NON SPECIFIED      • LUTEIN PO Take  by mouth.     • Probiotic Product (PROBIOTIC DAILY PO) Take  by mouth.     • Nutritional Supplements (VITAMIN D MAINTENANCE PO) Take  by mouth.     • hydrocortisone 2.5 % Cream topical cream Apply  to affected area(s) 2 times a day.       No current facility-administered medications for this visit.      Social History     Tobacco Use   • Smoking status: Former Smoker     Packs/day: 0.25     Years: 7.00     Pack years: 1.75     Types: Cigarettes     Start date:      Quit date:      Years since quittin.8   • Smokeless tobacco: Never Used   Substance Use Topics   • Alcohol use: No     Frequency: Never     Binge frequency: Never   • Drug use: No     Family History   Problem Relation Age of Onset   • Stroke Sister    • Hypertension Sister    • Stroke Brother    • Hypertension Brother        /76 (BP Location: Left arm, Patient Position: Sitting, BP Cuff Size: Small adult)   Pulse 91   Temp 36.6 °C (97.9 °F) (Temporal)   Resp 14   Ht 1.499  "m (4' 11\")   Wt 39.2 kg (86 lb 6.4 oz)   SpO2 97%  Body mass index is 17.45 kg/m².  Review of Systems   Constitutional: Negative for chills, fever and malaise/fatigue.   HENT: Negative for hearing loss and tinnitus.    Eyes: Negative for double vision and pain.   Respiratory: Negative for cough, shortness of breath and wheezing.    Cardiovascular: Negative for chest pain, palpitations and leg swelling.   Gastrointestinal: Negative for abdominal pain, diarrhea, nausea and vomiting.   Genitourinary: Negative for dysuria and frequency.   Musculoskeletal: Negative for joint pain and myalgias.   Skin: Positive for itching and rash.   Neurological: Negative for dizziness and headaches.     Physical Exam   Constitutional: She is well-developed, well-nourished, and in no distress. No distress.   HENT:   Head: Normocephalic and atraumatic.   Eyes: Pupils are equal, round, and reactive to light. Conjunctivae and EOM are normal. Right eye exhibits no discharge. Left eye exhibits no discharge. No scleral icterus.   Neck: No thyromegaly present.   Pulmonary/Chest: Effort normal. No respiratory distress.   Abdominal: She exhibits no distension.   Genitourinary:    Genitourinary Comments: Vaginal introitus wnl. Both labia majora with erythematous patches b/l with satellite lesions     Musculoskeletal:         General: No edema.   Neurological: She is alert.   Skin: Skin is warm and dry. She is not diaphoretic.   Psychiatric: Affect and judgment normal.         All labs (last 1 month):   No visits with results within 1 Month(s) from this visit.   Latest known visit with results is:   Orders Only on 06/25/2019   Component Date Value Ref Range Status   • WBC 06/25/2019 3.1* 3.4 - 10.8 x10E3/uL Final   • RBC 06/25/2019 4.65  3.77 - 5.28 x10E6/uL Final   • Hemoglobin 06/25/2019 14.6  11.1 - 15.9 g/dL Final   • Hematocrit 06/25/2019 42.3  34.0 - 46.6 % Final   • MCV 06/25/2019 91  79 - 97 fL Final   • MCH 06/25/2019 31.4  26.6 - 33.0 " pg Final   • MCHC 06/25/2019 34.5  31.5 - 35.7 g/dL Final   • RDW 06/25/2019 13.5  12.3 - 15.4 % Final   • Platelet Count 06/25/2019 281  150 - 450 x10E3/uL Final   • Neutrophils-Polys 06/25/2019 51  Not Estab. % Final   • Lymphocytes 06/25/2019 33  Not Estab. % Final   • Monocytes 06/25/2019 13  Not Estab. % Final   • Eosinophils 06/25/2019 3  Not Estab. % Final   • Basophils 06/25/2019 0  Not Estab. % Final   • Immature Cells 06/25/2019 CANCELED   Final    Result canceled by the ancillary   • Neutrophils (Absolute) 06/25/2019 1.6  1.4 - 7.0 x10E3/uL Final   • Lymphs (Absolute) 06/25/2019 1.0  0.7 - 3.1 x10E3/uL Final   • Monos (Absolute) 06/25/2019 0.4  0.1 - 0.9 x10E3/uL Final   • Eos (Absolute) 06/25/2019 0.1  0.0 - 0.4 x10E3/uL Final   • Baso (Absolute) 06/25/2019 0.0  0.0 - 0.2 x10E3/uL Final   • Immature Granulocytes 06/25/2019 0  Not Estab. % Final   • Immature Granulocytes (abs) 06/25/2019 0.0  0.0 - 0.1 x10E3/uL Final   • Nucleated RBC 06/25/2019 CANCELED   Final    Result canceled by the ancillary   • Comments-Diff 06/25/2019 CANCELED   Final    Result canceled by the ancillary   • Glucose 06/25/2019 70  65 - 99 mg/dL Final   • Bun 06/25/2019 8  8 - 27 mg/dL Final   • Creatinine 06/25/2019 0.68  0.57 - 1.00 mg/dL Final   • GFR If Non  06/25/2019 85  >59 mL/min/1.73 Final   • GFR If  06/25/2019 98  >59 mL/min/1.73 Final   • Bun-Creatinine Ratio 06/25/2019 12  12 - 28 Final   • Sodium 06/25/2019 132* 134 - 144 mmol/L Final   • Potassium 06/25/2019 4.3  3.5 - 5.2 mmol/L Final   • Chloride 06/25/2019 94* 96 - 106 mmol/L Final   • Co2 06/25/2019 19* 20 - 29 mmol/L Final   • Calcium 06/25/2019 9.5  8.7 - 10.3 mg/dL Final   • Total Protein 06/25/2019 7.7  6.0 - 8.5 g/dL Final   • Albumin 06/25/2019 4.5  3.5 - 4.8 g/dL Final   • Globulin 06/25/2019 3.2  1.5 - 4.5 g/dL Final   • A-G Ratio 06/25/2019 1.4  1.2 - 2.2 Final   • Total Bilirubin 06/25/2019 0.5  0.0 - 1.2 mg/dL Final   •  Alkaline Phosphatase 06/25/2019 110  39 - 117 IU/L Final   • AST(SGOT) 06/25/2019 20  0 - 40 IU/L Final   • ALT(SGPT) 06/25/2019 9  0 - 32 IU/L Final   • Cholesterol,Tot 06/25/2019 148  100 - 199 mg/dL Final   • Triglycerides 06/25/2019 74  0 - 149 mg/dL Final   • HDL 06/25/2019 65  >39 mg/dL Final   • VLDL Cholesterol Calc 06/25/2019 15  5 - 40 mg/dL Final   • LDL 06/25/2019 68  0 - 99 mg/dL Final   • Comment: 06/25/2019 CANCELED   Final    Result canceled by the ancillary   • Glycohemoglobin 06/25/2019 5.7* 4.8 - 5.6 % Final    Comment: Prediabetes: 5.7 - 6.4  Diabetes: >6.4  Glycemic control for adults with diabetes: <7.0     • 25-Hydroxy   Vitamin D 25 06/25/2019 40.7  30.0 - 100.0 ng/mL Final    Comment: Vitamin D deficiency has been defined by the [place] [employer] and an Endocrine Society practice guideline as a  level of serum 25-OH vitamin D less than 20 ng/mL (1,2).  The Endocrine Society went on to further define vitamin D  insufficiency as a level between 21 and 29 ng/mL (2).  1. IOM ([place] [employer]). 2010. Dietary reference  intakes for calcium and D. [place]: The  National Academies Press.  2. Rodney MF, Yovana NC, Lisa MILLER, et al.  Evaluation, treatment, and prevention of vitamin D  deficiency: an Endocrine Society clinical practice  guideline. JCEM. 2011 Jul; 96(7):1911-30.     • C Reactive Protein High Sensitive 06/25/2019 <1  0 - 10 mg/L Final    Comment: **Please note reference interval change**  A courtesy copy of this report has been sent to  the patient.         Lipids:   Lab Results   Component Value Date/Time    CHOLSTRLTOT 148 06/25/2019 11:45 AM    TRIGLYCERIDE 74 06/25/2019 11:45 AM    HDL 65 06/25/2019 11:45 AM    LDL 68 06/25/2019 11:45 AM       Imaging: No results found.    A/P  ua normal. I rec flu and pneumococcal for her. She would like to think about it  Return for annual.    Problem List Items Addressed This Visit     None      Visit Diagnoses      Urinary pain        Relevant Orders    POCT Urinalysis (Completed)    Candidiasis of vagina        Relevant Medications    miconazole (MICOTIN) 2 % Cream          Portions of this note may be dictated using Dragon [employer] voice recognition software.  Variances in spelling and vocabulary are possible and unintentional.  Not all areas may be caught/corrected.  Please notify me if any discrepancies are noted or if the meaning of any statement is not correct/clear.

## 2020-11-09 ENCOUNTER — NON-PROVIDER VISIT (OUTPATIENT)
Dept: MEDICAL GROUP | Facility: IMAGING CENTER | Age: 80
End: 2020-11-09
Payer: MEDICARE

## 2020-11-09 DIAGNOSIS — Z23 NEED FOR VACCINATION: ICD-10-CM

## 2020-11-09 PROCEDURE — G0008 ADMIN INFLUENZA VIRUS VAC: HCPCS | Performed by: FAMILY MEDICINE

## 2020-11-09 PROCEDURE — 90662 IIV NO PRSV INCREASED AG IM: CPT | Performed by: FAMILY MEDICINE

## 2020-11-09 NOTE — PROGRESS NOTES
"Court Cortes is a 80 y.o. female here for a non-provider visit for:   FLU    Reason for immunization: Annual Flu Vaccine  Immunization records indicate need for vaccine: Yes, confirmed with Epic  Minimum interval has been met for this vaccine: Yes  ABN completed: Not Indicated    Order and dose verified by: David GONZALEZ Dated  08/15/19 was given to patient: Yes  All IAC Questionnaire questions were answered \"No.\"    Patient tolerated injection and no adverse effects were observed or reported: Yes    Pt scheduled for next dose in series: No  "

## 2021-01-11 DIAGNOSIS — Z23 NEED FOR VACCINATION: ICD-10-CM

## 2021-01-26 ENCOUNTER — OFFICE VISIT (OUTPATIENT)
Dept: MEDICAL GROUP | Facility: MEDICAL CENTER | Age: 81
End: 2021-01-26
Payer: MEDICARE

## 2021-01-26 VITALS
DIASTOLIC BLOOD PRESSURE: 68 MMHG | WEIGHT: 81 LBS | TEMPERATURE: 97.6 F | HEIGHT: 59 IN | SYSTOLIC BLOOD PRESSURE: 110 MMHG | BODY MASS INDEX: 16.33 KG/M2 | HEART RATE: 74 BPM | RESPIRATION RATE: 16 BRPM | OXYGEN SATURATION: 96 %

## 2021-01-26 DIAGNOSIS — E55.9 VITAMIN D DEFICIENCY: ICD-10-CM

## 2021-01-26 DIAGNOSIS — E78.5 HYPERLIPIDEMIA, UNSPECIFIED HYPERLIPIDEMIA TYPE: ICD-10-CM

## 2021-01-26 DIAGNOSIS — R55 VASOVAGAL SYNCOPE: ICD-10-CM

## 2021-01-26 DIAGNOSIS — R73.03 PREDIABETES: ICD-10-CM

## 2021-01-26 DIAGNOSIS — M81.0 AGE-RELATED OSTEOPOROSIS WITHOUT CURRENT PATHOLOGICAL FRACTURE: ICD-10-CM

## 2021-01-26 PROCEDURE — 99214 OFFICE O/P EST MOD 30 MIN: CPT | Performed by: FAMILY MEDICINE

## 2021-01-26 RX ORDER — MICONAZOLE NITRATE 20 MG/G
CREAM TOPICAL
COMMUNITY
Start: 2021-01-05 | End: 2021-06-04

## 2021-01-26 ASSESSMENT — PATIENT HEALTH QUESTIONNAIRE - PHQ9: CLINICAL INTERPRETATION OF PHQ2 SCORE: 0

## 2021-01-26 ASSESSMENT — FIBROSIS 4 INDEX: FIB4 SCORE: 1.85

## 2021-01-26 ASSESSMENT — ENCOUNTER SYMPTOMS: GENERAL WELL-BEING: EXCELLENT

## 2021-01-26 ASSESSMENT — ACTIVITIES OF DAILY LIVING (ADL): BATHING_REQUIRES_ASSISTANCE: 0

## 2021-01-26 NOTE — PROGRESS NOTES
"cc: Hyperlipidemia    Subjective:     Court Cortes is a 80 y.o. female presenting to establish care.  Has a history of hyperlipidemia, is not on any medications.  Continues to eat healthy, exercises regularly.  She denies any chest pain, shortness of breath, lightheadedness, dizziness, leg swelling.  She does have osteoporosis, takes vitamin D and calcium regularly.  Is not interested in another DEXA scan as she would not want to start a medication for this.  She also reports that she has not had a syncopal episode since March 2020.  She has not followed up with neurology, is not interested in testing for seizures or starting any other medications.      Review of systems:  See above.       Current Outpatient Medications:   •  Cyanocobalamin (B-12) 100 MCG Tab, Take  by mouth., Disp: , Rfl:   •  Calcium Carb-Cholecalciferol (CALCIUM 1000 + D) 1000-800 MG-UNIT Tab, Take  by mouth., Disp: , Rfl:   •  MICATIN 2 % Cream, APPLY CREAM TO AFFECTED AREA TWICE DAILY FOR 14 DAYS, Disp: , Rfl:     Allergies, past medical history, past surgical history, family history, social history reviewed and updated    Objective:     Vitals: /68   Pulse 74   Temp 36.4 °C (97.6 °F)   Resp 16   Ht 1.499 m (4' 11\")   Wt 36.7 kg (81 lb)   SpO2 96%   BMI 16.36 kg/m²   General: Alert, pleasant, NAD  HEENT: Normocephalic.    Heart: Regular rate and rhythm.  S1 and S2 normal.  No murmurs appreciated.  Respiratory: Normal respiratory effort.  Clear to auscultation bilaterally.  Abdomen: Non-distended, soft  Skin: Warm, dry, no rashes.  Musculoskeletal: Gait is normal.  Moves all extremities well.  Extremities: No leg edema.  Psych:  Affect/mood is normal, judgement is good, memory is intact, grooming is appropriate.    Assessment/Plan:     Court was seen today for [facility].    Diagnoses and all orders for this visit:    Hyperlipidemia, unspecified hyperlipidemia type  Stable, continue to monitor  -     CBC WITHOUT " DIFFERENTIAL; Future  -     Comp Metabolic Panel; Future  -     Lipid Profile; Future  -     CRP QUANTITIVE (NON-CARDIAC); Future    Vitamin D deficiency  Stable, continue supplements  -     CBC WITHOUT DIFFERENTIAL; Future  -     Comp Metabolic Panel; Future  -     VITAMIN D,25 HYDROXY; Future    Age-related osteoporosis without current pathological fracture  Stable, continue supplements, patient declines a DEXA scan  -     CBC WITHOUT DIFFERENTIAL; Future  -     Comp Metabolic Panel; Future    Vasovagal syncope  Stable, continue to monitor  -     CBC WITHOUT DIFFERENTIAL; Future  -     Comp Metabolic Panel; Future    Prediabetes  Stable, continue  -     Comp Metabolic Panel; Future  -     HEMOGLOBIN A1C; Future          Return if symptoms worsen or fail to improve.

## 2021-04-20 ENCOUNTER — OFFICE VISIT (OUTPATIENT)
Dept: MEDICAL GROUP | Facility: MEDICAL CENTER | Age: 81
End: 2021-04-20
Payer: MEDICARE

## 2021-04-20 VITALS
BODY MASS INDEX: 16.13 KG/M2 | OXYGEN SATURATION: 96 % | WEIGHT: 80 LBS | TEMPERATURE: 97.6 F | DIASTOLIC BLOOD PRESSURE: 74 MMHG | HEIGHT: 59 IN | RESPIRATION RATE: 16 BRPM | HEART RATE: 74 BPM | SYSTOLIC BLOOD PRESSURE: 112 MMHG

## 2021-04-20 DIAGNOSIS — L30.9 DERMATITIS: ICD-10-CM

## 2021-04-20 PROCEDURE — 99213 OFFICE O/P EST LOW 20 MIN: CPT | Performed by: FAMILY MEDICINE

## 2021-04-20 ASSESSMENT — FIBROSIS 4 INDEX: FIB4 SCORE: 1.85

## 2021-04-20 NOTE — PROGRESS NOTES
"  Subjective:     Court Cortes is a 80 y.o. female presenting to discuss a rash.  She noticed a red bump on her right cheek about 4 days ago.  She also noticed another one on her left chin.  She started putting hydrocortisone 1% cream on it, which seems to be helping.  The bump is much smaller and less red.  Denies any lesions elsewhere.  She has been working on her diet, trying to eat healthier.  Is also reading a book about oxalates causing various rashes.        Current Outpatient Medications:   •  MICATIN 2 % Cream, APPLY CREAM TO AFFECTED AREA TWICE DAILY FOR 14 DAYS, Disp: , Rfl:   •  Cyanocobalamin (B-12) 100 MCG Tab, Take  by mouth., Disp: , Rfl:   •  Calcium Carb-Cholecalciferol (CALCIUM 1000 + D) 1000-800 MG-UNIT Tab, Take  by mouth., Disp: , Rfl:     Objective:     Vitals: /74   Pulse 74   Temp 36.4 °C (97.6 °F)   Resp 16   Ht 1.499 m (4' 11\")   Wt 36.3 kg (80 lb)   SpO2 96%   BMI 16.16 kg/m²   General: Alert  HEENT: Normocephalic.   Skin: Erythematous macule on the right cheek measuring approximately 4 mm.  Faintly erythematous macule on the left chin    Assessment/Plan:     Court was seen today for rash.    Diagnoses and all orders for this visit:    Dermatitis  Self-limited issue.  Seems to be improving, recommended continue with the hydrocortisone cream.  If the lesions persist for another month, she will call and let us know.  Will refer to dermatology then.  Discussed facial washes, lotions, mask hygiene..        Return if symptoms worsen or fail to improve.    "

## 2021-06-04 ENCOUNTER — OFFICE VISIT (OUTPATIENT)
Dept: MEDICAL GROUP | Facility: MEDICAL CENTER | Age: 81
End: 2021-06-04
Payer: MEDICARE

## 2021-06-04 VITALS
OXYGEN SATURATION: 96 % | HEART RATE: 74 BPM | HEIGHT: 59 IN | TEMPERATURE: 97.6 F | RESPIRATION RATE: 16 BRPM | SYSTOLIC BLOOD PRESSURE: 118 MMHG | BODY MASS INDEX: 16.33 KG/M2 | DIASTOLIC BLOOD PRESSURE: 68 MMHG | WEIGHT: 81 LBS

## 2021-06-04 DIAGNOSIS — L30.9 DERMATITIS: ICD-10-CM

## 2021-06-04 PROCEDURE — 99212 OFFICE O/P EST SF 10 MIN: CPT | Performed by: FAMILY MEDICINE

## 2021-06-04 ASSESSMENT — FIBROSIS 4 INDEX: FIB4 SCORE: 1.85

## 2021-06-04 NOTE — PROGRESS NOTES
"  Subjective:     Court Cortes is a 80 y.o. female presenting to discuss a rash.  Started 1 to 2 weeks ago on her right forearm.  Was initially itchy and spreading, but is now improving.  Has been using hydrocortisone cream, which has been helpful.  Denies any new soaps, lotions, detergents, medications, supplements.  She reports that she periodically gets similar rashes in various areas, usually improves with cortisone cream.      Current Outpatient Medications:   •  Cyanocobalamin (B-12) 100 MCG Tab, Take  by mouth., Disp: , Rfl:   •  Calcium Carb-Cholecalciferol (CALCIUM 1000 + D) 1000-800 MG-UNIT Tab, Take  by mouth., Disp: , Rfl:     Objective:     Vitals: /68   Pulse 74   Temp 36.4 °C (97.6 °F)   Resp 16   Ht 1.499 m (4' 11\")   Wt 36.7 kg (81 lb)   SpO2 96%   BMI 16.36 kg/m²   General: Alert  HEENT: Normocephalic.   Skin: Hyperpigmented maculopapular lesions along the right forearm    Assessment/Plan:     Court was seen today for rash.    Diagnoses and all orders for this visit:    Dermatitis  Self-limited issue.  Her rash appears to be healing.  Discussed possible contact dermatitis.  Recommended continuing with the cortisone cream        Return if symptoms worsen or fail to improve.    "

## 2021-10-07 ENCOUNTER — HOSPITAL ENCOUNTER (OUTPATIENT)
Dept: LAB | Facility: MEDICAL CENTER | Age: 81
End: 2021-10-07
Attending: FAMILY MEDICINE
Payer: MEDICARE

## 2021-10-07 DIAGNOSIS — R73.03 PREDIABETES: ICD-10-CM

## 2021-10-07 DIAGNOSIS — E78.5 HYPERLIPIDEMIA, UNSPECIFIED HYPERLIPIDEMIA TYPE: ICD-10-CM

## 2021-10-07 DIAGNOSIS — R55 VASOVAGAL SYNCOPE: ICD-10-CM

## 2021-10-07 DIAGNOSIS — M81.0 AGE-RELATED OSTEOPOROSIS WITHOUT CURRENT PATHOLOGICAL FRACTURE: ICD-10-CM

## 2021-10-07 DIAGNOSIS — E55.9 VITAMIN D DEFICIENCY: ICD-10-CM

## 2021-10-07 LAB
25(OH)D3 SERPL-MCNC: 60 NG/ML (ref 30–100)
ALBUMIN SERPL BCP-MCNC: 4.6 G/DL (ref 3.2–4.9)
ALBUMIN/GLOB SERPL: 1.5 G/DL
ALP SERPL-CCNC: 96 U/L (ref 30–99)
ALT SERPL-CCNC: 11 U/L (ref 2–50)
ANION GAP SERPL CALC-SCNC: 10 MMOL/L (ref 7–16)
AST SERPL-CCNC: 21 U/L (ref 12–45)
BILIRUB SERPL-MCNC: 0.5 MG/DL (ref 0.1–1.5)
BUN SERPL-MCNC: 8 MG/DL (ref 8–22)
CALCIUM SERPL-MCNC: 9.8 MG/DL (ref 8.5–10.5)
CHLORIDE SERPL-SCNC: 100 MMOL/L (ref 96–112)
CHOLEST SERPL-MCNC: 185 MG/DL (ref 100–199)
CO2 SERPL-SCNC: 26 MMOL/L (ref 20–33)
CREAT SERPL-MCNC: 0.68 MG/DL (ref 0.5–1.4)
CRP SERPL HS-MCNC: <0.3 MG/DL (ref 0–0.75)
ERYTHROCYTE [DISTWIDTH] IN BLOOD BY AUTOMATED COUNT: 43.8 FL (ref 35.9–50)
EST. AVERAGE GLUCOSE BLD GHB EST-MCNC: 120 MG/DL
FASTING STATUS PATIENT QL REPORTED: NORMAL
GLOBULIN SER CALC-MCNC: 3.1 G/DL (ref 1.9–3.5)
GLUCOSE SERPL-MCNC: 94 MG/DL (ref 65–99)
HBA1C MFR BLD: 5.8 % (ref 4–5.6)
HCT VFR BLD AUTO: 41.9 % (ref 37–47)
HDLC SERPL-MCNC: 81 MG/DL
HGB BLD-MCNC: 13.9 G/DL (ref 12–16)
LDLC SERPL CALC-MCNC: 91 MG/DL
MCH RBC QN AUTO: 31.4 PG (ref 27–33)
MCHC RBC AUTO-ENTMCNC: 33.2 G/DL (ref 33.6–35)
MCV RBC AUTO: 94.6 FL (ref 81.4–97.8)
PLATELET # BLD AUTO: 290 K/UL (ref 164–446)
PMV BLD AUTO: 9.1 FL (ref 9–12.9)
POTASSIUM SERPL-SCNC: 5.1 MMOL/L (ref 3.6–5.5)
PROT SERPL-MCNC: 7.7 G/DL (ref 6–8.2)
RBC # BLD AUTO: 4.43 M/UL (ref 4.2–5.4)
SODIUM SERPL-SCNC: 136 MMOL/L (ref 135–145)
TRIGL SERPL-MCNC: 64 MG/DL (ref 0–149)
WBC # BLD AUTO: 3.8 K/UL (ref 4.8–10.8)

## 2021-10-07 PROCEDURE — 80053 COMPREHEN METABOLIC PANEL: CPT

## 2021-10-07 PROCEDURE — 85027 COMPLETE CBC AUTOMATED: CPT

## 2021-10-07 PROCEDURE — 86140 C-REACTIVE PROTEIN: CPT

## 2021-10-07 PROCEDURE — 36415 COLL VENOUS BLD VENIPUNCTURE: CPT

## 2021-10-07 PROCEDURE — 83036 HEMOGLOBIN GLYCOSYLATED A1C: CPT | Mod: GA

## 2021-10-07 PROCEDURE — 82306 VITAMIN D 25 HYDROXY: CPT

## 2021-10-07 PROCEDURE — 80061 LIPID PANEL: CPT

## 2022-01-11 ENCOUNTER — OFFICE VISIT (OUTPATIENT)
Dept: MEDICAL GROUP | Facility: MEDICAL CENTER | Age: 82
End: 2022-01-11
Payer: MEDICARE

## 2022-01-11 VITALS
OXYGEN SATURATION: 96 % | WEIGHT: 75 LBS | SYSTOLIC BLOOD PRESSURE: 124 MMHG | RESPIRATION RATE: 16 BRPM | BODY MASS INDEX: 15.12 KG/M2 | HEART RATE: 84 BPM | HEIGHT: 59 IN | DIASTOLIC BLOOD PRESSURE: 72 MMHG | TEMPERATURE: 97.6 F

## 2022-01-11 DIAGNOSIS — E78.5 HYPERLIPIDEMIA, UNSPECIFIED HYPERLIPIDEMIA TYPE: ICD-10-CM

## 2022-01-11 DIAGNOSIS — R73.03 PREDIABETES: ICD-10-CM

## 2022-01-11 DIAGNOSIS — J06.9 VIRAL URI WITH COUGH: ICD-10-CM

## 2022-01-11 PROBLEM — E87.1 HYPONATREMIA: Status: RESOLVED | Noted: 2018-03-16 | Resolved: 2022-01-11

## 2022-01-11 PROBLEM — R55 VASOVAGAL SYNCOPE: Status: RESOLVED | Noted: 2020-02-19 | Resolved: 2022-01-11

## 2022-01-11 PROCEDURE — 99212 OFFICE O/P EST SF 10 MIN: CPT | Performed by: FAMILY MEDICINE

## 2022-01-11 RX ORDER — BENZONATATE 100 MG/1
100 CAPSULE ORAL 3 TIMES DAILY PRN
Qty: 60 CAPSULE | Refills: 0 | Status: SHIPPED | OUTPATIENT
Start: 2022-01-11 | End: 2022-10-27

## 2022-01-11 ASSESSMENT — FIBROSIS 4 INDEX: FIB4 SCORE: 1.72

## 2022-01-11 ASSESSMENT — PATIENT HEALTH QUESTIONNAIRE - PHQ9: CLINICAL INTERPRETATION OF PHQ2 SCORE: 0

## 2022-01-11 NOTE — PROGRESS NOTES
"  Subjective:     Court Cortes is a 81 y.o. female presenting to discuss a cough.  Started January 1.  Is improving.  No other associated symptoms.  Denies any fevers, headaches, ear pain, sore throat, congestion, nasal drainage, body aches, shortness of breath, wheezing, chest pain, abdominal pain, allergies, heartburn.  Has tried nothing for this yet.        Current Outpatient Medications:   •  benzonatate (TESSALON) 100 MG Cap, Take 1 Capsule by mouth 3 times a day as needed for Cough., Disp: 60 Capsule, Rfl: 0  •  Cyanocobalamin (B-12) 100 MCG Tab, Take  by mouth., Disp: , Rfl:   •  Calcium Carb-Cholecalciferol (CALCIUM 1000 + D) 1000-800 MG-UNIT Tab, Take  by mouth., Disp: , Rfl:     Objective:     Vitals: /72   Pulse 84   Temp 36.4 °C (97.6 °F)   Resp 16   Ht 1.499 m (4' 11\")   Wt 34 kg (75 lb)   SpO2 96%   BMI 15.15 kg/m²   General: Alert  HEENT: Normocephalic.   Heart: Regular rate and rhythm.  S1 and S2 normal.  No murmurs appreciated.  Respiratory: Normal respiratory effort.  Clear to auscultation bilaterally.  Abdomen: Non-distended, soft  Extremities: No leg edema.    Assessment/Plan:     Court was seen today for cough.    Diagnoses and all orders for this visit:    Viral URI with cough  Self limited issue.  Discussed likely viral URI, recommended conservative management with rest, fluids, otc meds, hot water and honey, nasal saline rinses.  Prescribed prescription for Tessalon sent to pharmacy.  We discussed testing for COVID, she declined.  She has already completed quarantine timeframe.  Discussed reasons to return, advised cough may last up to 3 weeks.  -     benzonatate (TESSALON) 100 MG Cap; Take 1 Capsule by mouth 3 times a day as needed for Cough.    Hyperlipidemia, unspecified hyperlipidemia type  -     Lipid Profile; Future  -     Basic Metabolic Panel; Future    Prediabetes  -     HEMOGLOBIN A1C; Future  -     Basic Metabolic Panel; Future        Return if symptoms worsen or " fail to improve.

## 2022-10-27 ENCOUNTER — OFFICE VISIT (OUTPATIENT)
Dept: URGENT CARE | Facility: CLINIC | Age: 82
End: 2022-10-27
Payer: MEDICARE

## 2022-10-27 VITALS
SYSTOLIC BLOOD PRESSURE: 126 MMHG | RESPIRATION RATE: 16 BRPM | DIASTOLIC BLOOD PRESSURE: 84 MMHG | TEMPERATURE: 99 F | WEIGHT: 84 LBS | OXYGEN SATURATION: 96 % | HEIGHT: 59 IN | HEART RATE: 98 BPM | BODY MASS INDEX: 16.93 KG/M2

## 2022-10-27 DIAGNOSIS — E78.5 HYPERLIPIDEMIA, UNSPECIFIED HYPERLIPIDEMIA TYPE: ICD-10-CM

## 2022-10-27 DIAGNOSIS — J06.9 VIRAL URI: ICD-10-CM

## 2022-10-27 DIAGNOSIS — J02.0 STREP THROAT: ICD-10-CM

## 2022-10-27 LAB
EXTERNAL QUALITY CONTROL: NORMAL
FLUAV+FLUBV AG SPEC QL IA: NEGATIVE
INT CON NEG: NORMAL
INT CON POS: NORMAL
S PYO AG THROAT QL: POSITIVE
SARS-COV+SARS-COV-2 AG RESP QL IA.RAPID: NEGATIVE

## 2022-10-27 PROCEDURE — 87804 INFLUENZA ASSAY W/OPTIC: CPT | Performed by: FAMILY MEDICINE

## 2022-10-27 PROCEDURE — 99213 OFFICE O/P EST LOW 20 MIN: CPT | Mod: CS | Performed by: FAMILY MEDICINE

## 2022-10-27 PROCEDURE — 87880 STREP A ASSAY W/OPTIC: CPT | Performed by: FAMILY MEDICINE

## 2022-10-27 PROCEDURE — 87426 SARSCOV CORONAVIRUS AG IA: CPT | Performed by: FAMILY MEDICINE

## 2022-10-27 RX ORDER — AMOXICILLIN 875 MG/1
875 TABLET, COATED ORAL 2 TIMES DAILY
Qty: 20 TABLET | Refills: 0 | Status: SHIPPED | OUTPATIENT
Start: 2022-10-27 | End: 2022-11-06

## 2022-10-27 ASSESSMENT — FIBROSIS 4 INDEX: FIB4 SCORE: 1.75

## 2022-10-27 NOTE — Clinical Note
Pt requesting CRP to be added to annual labs.   She was also having difficulty accessing [facility] - I gave her the number for the help desk  ab

## 2022-10-27 NOTE — PROGRESS NOTES
"   Subjective:     CC:  presents with Pharyngitis            Pharyngitis   This is a new problem. The current episode started in the past 3 days. The problem has been unchanged. There has been no fever. The pain is moderate. Associated symptoms include : dry cough, subj fever.       Pertinent negatives include no abdominal pain,  diarrhea, headaches, shortness of breath or vomiting. no exposure to strep or mono.   has tried acetaminophen for the symptoms. The treatment provided mild relief.     Social History     Tobacco Use    Smoking status: Former     Packs/day: 0.25     Years: 7.00     Pack years: 1.75     Types: Cigarettes     Start date:      Quit date:      Years since quittin.8    Smokeless tobacco: Never   Vaping Use    Vaping Use: Never used   Substance Use Topics    Alcohol use: No    Drug use: No       Past Medical History:   Diagnosis Date    SAH (subarachnoid hemorrhage) ([facility]) 2015    Vasovagal syncope 2020    -It sounds like some of her episodes are from vasovagal over stimulation.  I explained that such as with eating a large meal that the vagal nerve can get overstimulated and causes syncope.  So if this is a trigger for her it would be helpful to eat smaller more frequent meals. -In addition, I have talked about the possibility that this could be compounded with hypovolemia and or hyponatremia.  I h       Review of Systems   Constitutional: Positive for malaise/fatigue.        Respiratory: Negative for  sputum production and shortness of breath.    Cardiovascular: Negative for chest pain.   Gastrointestinal: Negative for nausea, vomiting, abdominal pain and diarrhea.   Genitourinary: Negative.    Neurological: Negative for dizziness and headaches.   All other systems reviewed and are negative.         Objective:   /84 (BP Location: Right arm, Patient Position: Sitting, BP Cuff Size: Adult)   Pulse 98   Temp 37.2 °C (99 °F) (Temporal)   Resp 16   Ht 1.499 m (4' 11\")  "  Wt 38.1 kg (84 lb)   SpO2 96%         Physical Exam   Constitutional:   oriented to person, place, and time.  appears well-developed and well-nourished. No distress.   HENT:   Head: Normocephalic and atraumatic.   Right Ear: External ear normal.   Left Ear: External ear normal.   Nose: Mucosal edema present. Right sinus exhibits no maxillary sinus tenderness and no frontal sinus tenderness. Left sinus exhibits no maxillary sinus tenderness and no frontal sinus tenderness.   Mouth/Throat: no posterior oropharyngeal exudate.   There is posterior oropharyngeal erythema present. No posterior oropharyngeal edema.   Tonsils absent     Eyes: Conjunctivae and EOM are normal. Pupils are equal, round, and reactive to light. Right eye exhibits no discharge. Left eye exhibits no discharge. No scleral icterus.   Neck: Normal range of motion. Neck supple. No JVD present. No tracheal deviation present. No thyromegaly present.   Cardiovascular: Normal rate, regular rhythm, normal heart sounds and intact distal pulses.  Exam reveals no friction rub.    No murmur heard.  Pulmonary/Chest: Effort normal and breath sounds normal. No respiratory distress.   no wheezes.   no rales.    Musculoskeletal:  exhibits no edema.   Lymphadenopathy:     Positive Cervical adenopathy.   Neurological:   alert and oriented to person, place, and time.   Skin: Skin is warm and dry. No erythema.   Psychiatric:   normal mood and affect.   Nursing note and vitals reviewed.           Rapid covid, influenza negative.     Assessment/Plan:      Strep throat  Rapid strep positive    - amoxicillin (AMOXIL) 875 MG tablet; Take 1 Tablet by mouth 2 times a day for 10 days.  Dispense: 20 Tablet; Refill: 0    Differential diagnosis, natural history, supportive care, and indications for immediate follow-up discussed. All questions answered. Patient agrees with the plan of care.     Follow-up as needed if symptoms worsen or fail to improve to PCP, Urgent care or  Emergency Room.     I have personally reviewed prior external notes and test results pertinent to today's visit.  I have independently reviewed and interpreted all diagnostics ordered during this urgent care acute visit.

## 2022-10-31 ENCOUNTER — OFFICE VISIT (OUTPATIENT)
Dept: URGENT CARE | Facility: CLINIC | Age: 82
End: 2022-10-31
Payer: MEDICARE

## 2022-10-31 ENCOUNTER — TELEPHONE (OUTPATIENT)
Dept: MEDICAL GROUP | Facility: MEDICAL CENTER | Age: 82
End: 2022-10-31
Payer: COMMERCIAL

## 2022-10-31 VITALS
HEART RATE: 81 BPM | RESPIRATION RATE: 14 BRPM | WEIGHT: 83 LBS | TEMPERATURE: 98.7 F | OXYGEN SATURATION: 96 % | BODY MASS INDEX: 16.73 KG/M2 | HEIGHT: 59 IN | SYSTOLIC BLOOD PRESSURE: 118 MMHG | DIASTOLIC BLOOD PRESSURE: 64 MMHG

## 2022-10-31 DIAGNOSIS — U07.1 COVID-19: ICD-10-CM

## 2022-10-31 DIAGNOSIS — Z20.822 EXPOSURE TO COVID-19 VIRUS: ICD-10-CM

## 2022-10-31 LAB
EXTERNAL QUALITY CONTROL: ABNORMAL
INT CON NEG: ABNORMAL
INT CON POS: ABNORMAL
SARS-COV+SARS-COV-2 AG RESP QL IA.RAPID: POSITIVE

## 2022-10-31 PROCEDURE — 99213 OFFICE O/P EST LOW 20 MIN: CPT | Mod: CS | Performed by: PHYSICIAN ASSISTANT

## 2022-10-31 PROCEDURE — 87426 SARSCOV CORONAVIRUS AG IA: CPT | Performed by: PHYSICIAN ASSISTANT

## 2022-10-31 ASSESSMENT — ENCOUNTER SYMPTOMS
FEVER: 0
HEADACHES: 0
EYE DISCHARGE: 0
SORE THROAT: 0
EYE REDNESS: 0
MYALGIAS: 0
SHORTNESS OF BREATH: 0
VOMITING: 0
DIARRHEA: 0
COUGH: 0
NAUSEA: 0

## 2022-10-31 ASSESSMENT — FIBROSIS 4 INDEX: FIB4 SCORE: 1.75

## 2022-10-31 NOTE — TELEPHONE ENCOUNTER
----- Message from Opal Delaney M.D. sent at 10/27/2022  3:19 PM PDT -----  Please let her know that it has been ordered    ----- Message -----  From: Alfonzo Drake M.D.  Sent: 10/27/2022   2:51 PM PDT  To: Opal Delaney M.D.    FYI:      I saw pt in urgent care today.    She requested that I reach out to you since she was having trouble contacting you.       Pt is requesting that CRP be added to her annual lab work.          States that she can be contacted by phone, if necessary.             Thanks.        Alfonzo drake MD      
Informed pt   
No

## 2022-10-31 NOTE — PROGRESS NOTES
Subjective     Court Cortes is a 82 y.o. female who presents with Coronavirus Screening (Exposure to covid, she is here for a retest on covid was here on 10/27/22, currently on antibiotics for strep throat, )          URI   This is a recurrent problem. Episode onset: x 5 days ago. The problem has been unchanged. There has been no fever. Associated symptoms include congestion. Pertinent negatives include no chest pain, coughing, diarrhea, ear pain, headaches, nausea, sore throat or vomiting. Treatments tried: The patient continues to take her antibiotics as prescribed.  She has not taken any additional OTC medications.     The patient was recently seen in clinic on 10/27/2022 for URI-like symptoms with associated sore throat.  The patient was tested for influenza and COVID-19 at that time, both of which were negative.  The patient was also tested for strep pharyngitis, which was positive.  The patient was started on antibiotics.  The patient states her  subsequently tested positive for COVID-19 on Saturday.  The patient states overall her symptoms have improved, but she is requesting to be retested for COVID-19 at this time.      PMH:  has a past medical history of SAH (subarachnoid hemorrhage) ([facility]) (1/23/2015) and Vasovagal syncope (2/19/2020).  MEDS:   Current Outpatient Medications:     Ascorbic Acid (VITAMIN C PO), Take  by mouth., Disp: , Rfl:     Probiotic Product (CULTURELLE PROBIOTICS PO), Take  by mouth., Disp: , Rfl:     amoxicillin (AMOXIL) 875 MG tablet, Take 1 Tablet by mouth 2 times a day for 10 days., Disp: 20 Tablet, Rfl: 0    Cyanocobalamin (B-12) 100 MCG Tab, Take  by mouth., Disp: , Rfl:     Calcium Carb-Cholecalciferol (CALCIUM 1000 + D) 1000-800 MG-UNIT Tab, Take  by mouth., Disp: , Rfl:   ALLERGIES:   Allergies   Allergen Reactions    Sulfamethoxazole-Trimethoprim [Bactrim Ds] Nausea     SURGHX:   Past Surgical History:   Procedure Laterality Date    TONSILLECTOMY       SOCHX:   "reports that she quit smoking about 48 years ago. Her smoking use included cigarettes. She started smoking about 55 years ago. She has a 1.75 pack-year smoking history. She has never used smokeless tobacco. She reports that she does not drink alcohol and does not use drugs.  FH: Family history was reviewed, no pertinent findings to report      Review of Systems   Constitutional:  Negative for fever.   HENT:  Positive for congestion. Negative for ear pain and sore throat.    Eyes:  Negative for discharge and redness.   Respiratory:  Negative for cough and shortness of breath.    Cardiovascular:  Negative for chest pain.   Gastrointestinal:  Negative for diarrhea, nausea and vomiting.   Musculoskeletal:  Negative for myalgias.   Neurological:  Negative for headaches.            Objective     /64   Pulse 81   Temp 37.1 °C (98.7 °F) (Temporal)   Resp 14   Ht 1.499 m (4' 11\")   Wt 37.6 kg (83 lb)   SpO2 96%   BMI 16.76 kg/m²      Physical Exam  Constitutional:       General: She is not in acute distress.     Appearance: Normal appearance. She is not ill-appearing.   HENT:      Head: Normocephalic and atraumatic.      Right Ear: External ear normal.      Left Ear: External ear normal.      Nose: Nose normal.      Mouth/Throat:      Mouth: Mucous membranes are moist.      Pharynx: Oropharynx is clear. No posterior oropharyngeal erythema.   Eyes:      Extraocular Movements: Extraocular movements intact.      Conjunctiva/sclera: Conjunctivae normal.   Cardiovascular:      Rate and Rhythm: Normal rate and regular rhythm.      Heart sounds: Normal heart sounds.   Pulmonary:      Effort: Pulmonary effort is normal. No respiratory distress.      Breath sounds: Normal breath sounds. No wheezing.   Musculoskeletal:         General: Normal range of motion.      Cervical back: Normal range of motion and neck supple.   Skin:     General: Skin is warm and dry.   Neurological:      Mental Status: She is alert and oriented " to person, place, and time.             Progress:  POCT Rapid COVID-19: POSITIVE               Assessment & Plan          1. Exposure to COVID-19 virus  - POCT SARS-COV Antigen SHAD (Symptomatic only)    2. COVID-19    The patient's presenting symptoms and physical exam findings are consistent with COVID-19 exposure.  The patient was recently seen in clinic on 10/27/2022 for URI-like symptoms.  The patient was tested for influenza and COVID-19 at that time, both of which were negative.  The patient was also tested for strep pharyngitis, which was positive.  The patient states her  has subsequently tested positive for COVID-19.  The patient is requesting to be reevaluated for COVID-19 at this time.  The patient states overall her symptoms have improved.  The patient's physical exam today in clinic was normal.  The patient's lungs are clear to auscultation without wheezing, and her pulse ox was within normal limits.  The patient is nontoxic and appears in no acute distress.  The patient's vital signs are stable and within normal limits.  She is afebrile today in clinic.  The patient's POCT rapid COVID-19 testing today in clinic was positive.  I suspect that the patient's symptoms have been related to COVID-19 throughout the duration of her illness.  However, it may have been too early to detect COVID-19 at her previous visit.  Given the duration of the patient's symptoms, the patient is no longer a candidate for treatment with Paxlovid.  Advised the patient to continue her antibiotic as prescribed.  Instructed the patient to stay at home under self quarantine per CDC guidelines.  Recommend OTC medications and supportive care for symptomatic management.  Recommend the patient follow-up with her PCP as needed.  Discussed return precautions with the patient, and she verbalized understanding.    Differential diagnoses, supportive care, and indications for immediate follow-up discussed with patient.   Instructed to  return to clinic or nearest emergency department for any change in condition, further concerns, or worsening of symptoms.    OTC Tylenol or Motrin for fever/discomfort.  OTC cough/cold medication for symptomatic relief  OTC Supportive Care for Congestion - saline nasal spray or neti pot  Drink plenty of fluids  Follow-up with PCP  Return to clinic or go to the ED if symptoms worsen or fail to improve, or if the patient should develop worsening/increasing cough, congestion, ear pain, sore throat, shortness of breath, wheezing, chest pain, fever/chills, and/or any concerning symptoms.    Discussed plan with the patient, and she agrees to the above.     I personally reviewed prior external notes and test results pertinent to today's visit.  I have independently reviewed and interpreted all diagnostics ordered during this urgent care visit.     Please note that this dictation was created using voice recognition software. I have made every reasonable attempt to correct obvious errors, but I expect that there may be errors of grammar and possibly content that I did not discover before finalizing the note.     This note was electronically signed by Pamela Morataya PA-C

## 2022-11-11 ENCOUNTER — PATIENT MESSAGE (OUTPATIENT)
Dept: HEALTH INFORMATION MANAGEMENT | Facility: OTHER | Age: 82
End: 2022-11-11

## 2023-01-14 ENCOUNTER — PATIENT MESSAGE (OUTPATIENT)
Dept: MEDICAL GROUP | Facility: MEDICAL CENTER | Age: 83
End: 2023-01-14
Payer: COMMERCIAL

## 2023-01-14 DIAGNOSIS — R73.03 PREDIABETES: ICD-10-CM

## 2023-01-14 DIAGNOSIS — E55.9 VITAMIN D DEFICIENCY: ICD-10-CM

## 2023-01-14 DIAGNOSIS — E78.5 HYPERLIPIDEMIA, UNSPECIFIED HYPERLIPIDEMIA TYPE: ICD-10-CM

## 2023-03-05 ENCOUNTER — APPOINTMENT (OUTPATIENT)
Dept: RADIOLOGY | Facility: MEDICAL CENTER | Age: 83
End: 2023-03-05
Attending: EMERGENCY MEDICINE
Payer: MEDICARE

## 2023-03-05 ENCOUNTER — HOSPITAL ENCOUNTER (EMERGENCY)
Facility: MEDICAL CENTER | Age: 83
End: 2023-03-05
Attending: EMERGENCY MEDICINE
Payer: MEDICARE

## 2023-03-05 VITALS
WEIGHT: 83.55 LBS | RESPIRATION RATE: 15 BRPM | SYSTOLIC BLOOD PRESSURE: 132 MMHG | BODY MASS INDEX: 16.88 KG/M2 | DIASTOLIC BLOOD PRESSURE: 63 MMHG | OXYGEN SATURATION: 97 % | TEMPERATURE: 98.1 F | HEART RATE: 103 BPM

## 2023-03-05 DIAGNOSIS — S09.90XA CLOSED HEAD INJURY, INITIAL ENCOUNTER: ICD-10-CM

## 2023-03-05 DIAGNOSIS — R55 SYNCOPE, UNSPECIFIED SYNCOPE TYPE: ICD-10-CM

## 2023-03-05 LAB
ALBUMIN SERPL BCP-MCNC: 4.2 G/DL (ref 3.2–4.9)
ALBUMIN/GLOB SERPL: 1.2 G/DL
ALP SERPL-CCNC: 103 U/L (ref 30–99)
ALT SERPL-CCNC: 11 U/L (ref 2–50)
ANION GAP SERPL CALC-SCNC: 11 MMOL/L (ref 7–16)
APTT PPP: 26.7 SEC (ref 24.7–36)
AST SERPL-CCNC: 21 U/L (ref 12–45)
BASOPHILS # BLD AUTO: 0.2 % (ref 0–1.8)
BASOPHILS # BLD: 0.02 K/UL (ref 0–0.12)
BILIRUB SERPL-MCNC: 0.3 MG/DL (ref 0.1–1.5)
BUN SERPL-MCNC: 15 MG/DL (ref 8–22)
CALCIUM ALBUM COR SERPL-MCNC: 9.5 MG/DL (ref 8.5–10.5)
CALCIUM SERPL-MCNC: 9.7 MG/DL (ref 8.4–10.2)
CHLORIDE SERPL-SCNC: 104 MMOL/L (ref 96–112)
CO2 SERPL-SCNC: 22 MMOL/L (ref 20–33)
CREAT SERPL-MCNC: 0.64 MG/DL (ref 0.5–1.4)
EKG IMPRESSION: NORMAL
EOSINOPHIL # BLD AUTO: 0.01 K/UL (ref 0–0.51)
EOSINOPHIL NFR BLD: 0.1 % (ref 0–6.9)
ERYTHROCYTE [DISTWIDTH] IN BLOOD BY AUTOMATED COUNT: 44.4 FL (ref 35.9–50)
GFR SERPLBLD CREATININE-BSD FMLA CKD-EPI: 89 ML/MIN/1.73 M 2
GLOBULIN SER CALC-MCNC: 3.6 G/DL (ref 1.9–3.5)
GLUCOSE SERPL-MCNC: 95 MG/DL (ref 65–99)
HCT VFR BLD AUTO: 43.1 % (ref 37–47)
HGB BLD-MCNC: 14.6 G/DL (ref 12–16)
IMM GRANULOCYTES # BLD AUTO: 0.02 K/UL (ref 0–0.11)
IMM GRANULOCYTES NFR BLD AUTO: 0.2 % (ref 0–0.9)
INR PPP: 0.96 (ref 0.87–1.13)
LYMPHOCYTES # BLD AUTO: 0.7 K/UL (ref 1–4.8)
LYMPHOCYTES NFR BLD: 7.3 % (ref 22–41)
MCH RBC QN AUTO: 31.5 PG (ref 27–33)
MCHC RBC AUTO-ENTMCNC: 33.9 G/DL (ref 33.6–35)
MCV RBC AUTO: 92.9 FL (ref 81.4–97.8)
MONOCYTES # BLD AUTO: 0.51 K/UL (ref 0–0.85)
MONOCYTES NFR BLD AUTO: 5.3 % (ref 0–13.4)
NEUTROPHILS # BLD AUTO: 8.31 K/UL (ref 2–7.15)
NEUTROPHILS NFR BLD: 86.9 % (ref 44–72)
NRBC # BLD AUTO: 0 K/UL
NRBC BLD-RTO: 0 /100 WBC
PLATELET # BLD AUTO: 284 K/UL (ref 164–446)
PMV BLD AUTO: 8.4 FL (ref 9–12.9)
POTASSIUM SERPL-SCNC: 3.9 MMOL/L (ref 3.6–5.5)
PROT SERPL-MCNC: 7.8 G/DL (ref 6–8.2)
PROTHROMBIN TIME: 12.7 SEC (ref 12–14.6)
RBC # BLD AUTO: 4.64 M/UL (ref 4.2–5.4)
SODIUM SERPL-SCNC: 137 MMOL/L (ref 135–145)
TROPONIN T SERPL-MCNC: 7 NG/L (ref 6–19)
WBC # BLD AUTO: 9.6 K/UL (ref 4.8–10.8)

## 2023-03-05 PROCEDURE — 94760 N-INVAS EAR/PLS OXIMETRY 1: CPT

## 2023-03-05 PROCEDURE — 85025 COMPLETE CBC W/AUTO DIFF WBC: CPT

## 2023-03-05 PROCEDURE — 93005 ELECTROCARDIOGRAM TRACING: CPT | Performed by: EMERGENCY MEDICINE

## 2023-03-05 PROCEDURE — 99284 EMERGENCY DEPT VISIT MOD MDM: CPT

## 2023-03-05 PROCEDURE — 84484 ASSAY OF TROPONIN QUANT: CPT

## 2023-03-05 PROCEDURE — 36415 COLL VENOUS BLD VENIPUNCTURE: CPT

## 2023-03-05 PROCEDURE — 71045 X-RAY EXAM CHEST 1 VIEW: CPT

## 2023-03-05 PROCEDURE — 80053 COMPREHEN METABOLIC PANEL: CPT

## 2023-03-05 PROCEDURE — 72125 CT NECK SPINE W/O DYE: CPT

## 2023-03-05 PROCEDURE — 85610 PROTHROMBIN TIME: CPT

## 2023-03-05 PROCEDURE — 85730 THROMBOPLASTIN TIME PARTIAL: CPT

## 2023-03-05 PROCEDURE — 70450 CT HEAD/BRAIN W/O DYE: CPT

## 2023-03-05 RX ORDER — CEPHALEXIN 500 MG/1
500 CAPSULE ORAL 4 TIMES DAILY
Status: SHIPPED | COMMUNITY
End: 2023-03-14

## 2023-03-05 ASSESSMENT — FIBROSIS 4 INDEX: FIB4 SCORE: 1.75

## 2023-03-05 NOTE — ED NOTES
"Pt reports nausea and upset stomach since starting the abx; ptstates her hx of syncope was due to \"low salt levels\"  "

## 2023-03-05 NOTE — ED NOTES
Discharge instructions given to pt with verbalized understanding.  Pt ambulatory out of the ER with her .

## 2023-03-05 NOTE — ED TRIAGE NOTES
Chief Complaint   Patient presents with    Syncope     Syncopal episode around 0630 this AM; pt was standing up from the toilet and had LOC, fell forward and hit head, large bump w/ bruising over R side eye; pt denies any vision change     BP (!) 145/93   Pulse (!) 107   Temp 36.1 °C (97 °F) (Temporal)   Resp 18   Wt 37.9 kg (83 lb 8.9 oz)   SpO2 94%   BMI 16.88 kg/m²     Pt BIB family for above concern, pt has hx of syncope, currently on abx for tooth in infection after extraction; pt A&O at this time

## 2023-03-05 NOTE — ED NOTES
Med rec updated and complete, per pt   Allergies reviewed, per pt  Interviewed pt with  at bedside with permission from pt.  Pt had KEFLEX 500MG bottle at bedside, went over RX bottle and returned RX bottle back to pts

## 2023-03-05 NOTE — ED PROVIDER NOTES
ED Provider Note    CHIEF COMPLAINT  Chief Complaint   Patient presents with    Syncope     Syncopal episode around 0630 this AM; pt was standing up from the toilet and had LOC, fell forward and hit head, large bump w/ bruising over R side eye; pt denies any vision change       EXTERNAL RECORDS REVIEWED  Inpatient Notes the patient had a CT scan on 2015 that showed a small subarachnoid hemorrhage    HPI/ROS  LIMITATION TO HISTORY   Select: : None  OUTSIDE HISTORIAN(S):  Significant other     Court Cortes is a 82 y.o. female who presents with past medical history significant for subarachnoid hemorrhage in , vasovagal syncope in , the patient reports she has been on amoxicillin for dental infection and says that she got up from the toilet this morning and fell hitting her head.  There was no loss of consciousness.  The patient denies any focal neurologic deficits.  She has an obvious contusion of her forehead.  She denies vomiting or pain.  She denies being on any blood thinning medications.    PAST MEDICAL HISTORY   has a past medical history of SAH (subarachnoid hemorrhage) ([facility]) (2015) and Vasovagal syncope (2020).    SURGICAL HISTORY   has a past surgical history that includes tonsillectomy.    FAMILY HISTORY  Family History   Problem Relation Age of Onset    Stroke Sister     Hypertension Sister     Stroke Brother     Hypertension Brother        SOCIAL HISTORY  Social History     Tobacco Use    Smoking status: Former     Packs/day: 0.25     Years: 7.00     Pack years: 1.75     Types: Cigarettes     Start date:      Quit date:      Years since quittin.2    Smokeless tobacco: Never   Vaping Use    Vaping Use: Never used   Substance and Sexual Activity    Alcohol use: No    Drug use: No    Sexual activity: Never     Partners: Male       CURRENT MEDICATIONS  Home Medications       Reviewed by Dennis Nowak (Pharmacy Tech) on 23 at 0916  Med List  Status: Complete     Medication Last Dose Status   Ascorbic Acid (VITAMIN C PO) 3/4/2023 Active   CALCIUM PO 3/4/2023 Active   cephALEXin (KEFLEX) 500 MG Cap 3/4/2023 Active   Cholecalciferol (D3 PO) 3/4/2023 Active   Cyanocobalamin (B-12 PO) 3/4/2023 Active   Probiotic Product (CULTURELLE PROBIOTICS PO) 3/4/2023 Active                    ALLERGIES  Allergies   Allergen Reactions    Sulfamethoxazole-Trimethoprim [Bactrim Ds] Diarrhea     Pt reports that she got dehydrated and had bad diarrhea       PHYSICAL EXAM  VITAL SIGNS: /63   Pulse (!) 103   Temp 36.7 °C (98.1 °F) (Temporal)   Resp 15   Wt 37.9 kg (83 lb 8.9 oz)   SpO2 97%   BMI 16.88 kg/m²    Constitutional: Alert.  HENT: Hematoma of the right forehead.  Eyes: Pupils are equal and reactive, Conjunctiva normal, Non-icteric.   Neck: Normal range of motion, midline tenderness with no step-off.  Lymphatic: No lymphadenopathy noted.   Cardiovascular: Regular rate and rhythm, no murmurs.   Thorax & Lungs: Normal breath sounds, No respiratory distress, No wheezing, No chest tenderness.   Abdomen: Bowel sounds normal, Soft, No tenderness, No peritoneal signs, No masses, No pulsatile masses.   Skin: Warm, Dry, No erythema, No rash.   Back: No bony tenderness, No CVA tenderness.   Extremities: Intact distal pulses, No edema, No tenderness, No cyanosis.  Musculoskeletal: Good range of motion in all major joints. No tenderness to palpation or major deformities noted.   Neurologic: Alert , Normal motor function, Normal sensory function, No focal deficits noted.   Psychiatric: Affect normal, Judgment normal, Mood normal.       DIAGNOSTIC STUDIES / PROCEDURES  EKG  I have independently interpreted this EKG  This is a twelve-lead EKG interpretation by myself.  It is sinus tachycardia at a rate of 103.  The axis is normal.  The intervals are normal.  There is no ST elevation or depression.  My impression this EKG, sinus tachycardia, does not meet STEMI criteria at  this time.    LABS  Labs Reviewed   CBC WITH DIFFERENTIAL - Abnormal; Notable for the following components:       Result Value    MPV 8.4 (*)     Neutrophils-Polys 86.90 (*)     Lymphocytes 7.30 (*)     Neutrophils (Absolute) 8.31 (*)     Lymphs (Absolute) 0.70 (*)     All other components within normal limits    Narrative:     Biotin intake of greater than 5 mg per day may interfere with  troponin levels, causing false low values.  Indicate which anticoagulants the patient is on:->UNKNOWN   COMP METABOLIC PANEL - Abnormal; Notable for the following components:    Alkaline Phosphatase 103 (*)     Globulin 3.6 (*)     All other components within normal limits    Narrative:     Biotin intake of greater than 5 mg per day may interfere with  troponin levels, causing false low values.  Indicate which anticoagulants the patient is on:->UNKNOWN   TROPONIN    Narrative:     Biotin intake of greater than 5 mg per day may interfere with  troponin levels, causing false low values.  Indicate which anticoagulants the patient is on:->UNKNOWN   PROTHROMBIN TIME    Narrative:     Biotin intake of greater than 5 mg per day may interfere with  troponin levels, causing false low values.  Indicate which anticoagulants the patient is on:->UNKNOWN   APTT    Narrative:     Biotin intake of greater than 5 mg per day may interfere with  troponin levels, causing false low values.  Indicate which anticoagulants the patient is on:->UNKNOWN   CORRECTED CALCIUM    Narrative:     Biotin intake of greater than 5 mg per day may interfere with  troponin levels, causing false low values.  Indicate which anticoagulants the patient is on:->UNKNOWN   ESTIMATED GFR    Narrative:     Biotin intake of greater than 5 mg per day may interfere with  troponin levels, causing false low values.  Indicate which anticoagulants the patient is on:->UNKNOWN         RADIOLOGY  I have independently interpreted the diagnostic imaging associated with this visit and am  waiting the final reading from the radiologist.   My preliminary interpretation is as follows: Negative head CT  Radiologist interpretation:     DX-CHEST-PORTABLE (1 VIEW)   Final Result         1. No acute cardiopulmonary abnormalities are identified.      CT-CSPINE WITHOUT PLUS RECONS   Final Result         1. No acute fracture from C1 through T1 is visualized.         CT-HEAD W/O   Final Result         1. No acute intracranial abnormality. No evidence of acute intracranial hemorrhage or mass lesion.      2. Right frontal scalp hematoma.                     COURSE & MEDICAL DECISION MAKING    ED Observation Status? Yes; I am placing the patient in to an observation status due to a diagnostic uncertainty as well as therapeutic intensity. Patient placed in observation status at 9:15am AM, 3/5/2023.     Observation plan is as follows: Patient has head CT ordered, C-spine CT ordered, labs EKG.    Upon Reevaluation, the patient's condition has: Improved; and will be discharged.    Patient discharged from ED Observation status at 11:00 AM   (Time) 3/5/23 (Date).     INITIAL ASSESSMENT, COURSE AND PLAN  Care Narrative: The patient presents with head injury and syncope.  CT was ordered, C-spine CT was ordered, labs ordered, EKG was ordered.  Differential diagnosis includes arrhythmia, lab abnormality indicative of dehydration, electrolyte abnormality, she may have intracranial hemorrhage.    10:56 AM I reviewed the patient's chart again, on January 23, 2015 she had a subarachnoid hemorrhage.  She also has been worked up for syncope and was given a final diagnosis of vasovagal syncope.              ADDITIONAL PROBLEM LIST  History of syncope and subarachnoid hemorrhage  DISPOSITION AND DISCUSSIONS      Patient's head CT and C-spine CT are negative.  Her labs are unremarkable, EKG unremarkable, patient admits she has a history of vasovagal syncope.      I have discussed management of the patient with the following  physicians and NADEGE's: None    Discussion of management with other Q or appropriate source(s): None     Escalation of care considered, and ultimately not performed:acute inpatient care management, however at this time, the patient is most appropriate for outpatient management after observation the patient appears well and her head CT is negative for intracranial hemorrhage, C-spine negative for fracture, labs are unremarkable, EKG does not demonstrate malignant arrhythmia.    Barriers to care at this time, including but not limited to:  None .     Decision tools and prescription drugs considered including, but not limited to: NEXUS criteria CT scan of the neck was ordered for midline tenderness of the neck. .    FINAL DIAGNOSIS  1. Syncope, unspecified syncope type    2. Closed head injury, initial encounter           Electronically signed by: Kirill Woodson M.D., 3/5/2023 9:14 AM

## 2023-03-14 ENCOUNTER — OFFICE VISIT (OUTPATIENT)
Dept: MEDICAL GROUP | Facility: MEDICAL CENTER | Age: 83
End: 2023-03-14
Payer: MEDICARE

## 2023-03-14 VITALS
RESPIRATION RATE: 16 BRPM | TEMPERATURE: 97.8 F | OXYGEN SATURATION: 96 % | HEIGHT: 59 IN | WEIGHT: 85 LBS | DIASTOLIC BLOOD PRESSURE: 70 MMHG | BODY MASS INDEX: 17.14 KG/M2 | HEART RATE: 100 BPM | SYSTOLIC BLOOD PRESSURE: 120 MMHG

## 2023-03-14 DIAGNOSIS — R55 SYNCOPE, UNSPECIFIED SYNCOPE TYPE: ICD-10-CM

## 2023-03-14 DIAGNOSIS — R01.1 HEART MURMUR: ICD-10-CM

## 2023-03-14 PROCEDURE — 99213 OFFICE O/P EST LOW 20 MIN: CPT | Performed by: FAMILY MEDICINE

## 2023-03-14 ASSESSMENT — FIBROSIS 4 INDEX: FIB4 SCORE: 1.78

## 2023-03-14 ASSESSMENT — PATIENT HEALTH QUESTIONNAIRE - PHQ9: CLINICAL INTERPRETATION OF PHQ2 SCORE: 0

## 2023-03-14 NOTE — PROGRESS NOTES
"  Subjective:     Court Cortes is a 82 y.o. female presenting with her  for a hospital follow-up.  She reports passing out after having a bowel movement on 3/5.  Had no other associated symptoms.  Said this is her eighth syncopal episode over the past 8 years.  She reports that bowel movements are trigger for her.  She did see neurology in the past, an EEG was recommended at that time, but she never followed up.  Since her most recent fall, she reports feeling well.  She does have significant bruising on her face and a hematoma on her right forehead.  She reports being on Keflex recently for a dental infection, caused her to have frequent bowel movements prior to her syncopal episode.        Current Outpatient Medications:     CALCIUM PO, Take 1 Tablet by mouth 2 times a day., Disp: , Rfl:     Cholecalciferol (D3 PO), Take 1 Capsule by mouth every evening., Disp: , Rfl:     Ascorbic Acid (VITAMIN C PO), Take 1 Tablet by mouth every evening., Disp: , Rfl:     Probiotic Product (CULTURELLE PROBIOTICS PO), Take 1 Capsule by mouth every evening., Disp: , Rfl:     Cyanocobalamin (B-12 PO), Take 1 Tablet by mouth every evening., Disp: , Rfl:     Objective:     Vitals: /70   Pulse 100   Temp 36.6 °C (97.8 °F)   Resp 16   Ht 1.499 m (4' 11\")   Wt 38.6 kg (85 lb)   SpO2 96%   BMI 17.17 kg/m²   General: Alert  HEENT: Approximately 3 to 4 cm hematoma on the right forehead.  Ecchymosis present mostly in the right face  Heart: Regular rate and rhythm.  S1 and S2 normal.  Grade 2/6 systolic murmur  Respiratory: Normal respiratory effort.  Clear to auscultation bilaterally.  Abdomen: Non-distended, soft  Extremities: No leg edema.    Assessment/Plan:     Court was seen today for follow-up.    Diagnoses and all orders for this visit:    Syncope, unspecified syncope type  Acute, uncomplicated issue.  Emergency department records reviewed.  We discussed a referral back to neurology for consideration of an " EEG again, she declined this for now.  We discussed checking an echocardiogram as she does have a faint murmur today.  She plans to do her yearly labs soon.  We will continue to monitor.  -     EC-ECHOCARDIOGRAM COMPLETE W/O CONT; Future  -     Patient identified as fall risk.  Appropriate orders and counseling given.    Heart murmur  Undiagnosed new problem with uncertain prognosis.  Faint murmur heard on exam today, will check an echocardiogram  -     EC-ECHOCARDIOGRAM COMPLETE W/O CONT; Future          Return if symptoms worsen or fail to improve.       20-Sep-2022

## 2023-03-15 ENCOUNTER — APPOINTMENT (OUTPATIENT)
Dept: RADIOLOGY | Facility: MEDICAL CENTER | Age: 83
End: 2023-03-15
Attending: EMERGENCY MEDICINE
Payer: MEDICARE

## 2023-03-15 ENCOUNTER — HOSPITAL ENCOUNTER (EMERGENCY)
Facility: MEDICAL CENTER | Age: 83
End: 2023-03-15
Attending: EMERGENCY MEDICINE
Payer: MEDICARE

## 2023-03-15 VITALS
BODY MASS INDEX: 18.09 KG/M2 | HEIGHT: 59 IN | SYSTOLIC BLOOD PRESSURE: 136 MMHG | DIASTOLIC BLOOD PRESSURE: 73 MMHG | TEMPERATURE: 98.7 F | OXYGEN SATURATION: 95 % | RESPIRATION RATE: 11 BRPM | WEIGHT: 89.73 LBS | HEART RATE: 90 BPM

## 2023-03-15 DIAGNOSIS — G44.319 ACUTE POST-TRAUMATIC HEADACHE, NOT INTRACTABLE: ICD-10-CM

## 2023-03-15 DIAGNOSIS — R03.0 ELEVATED BLOOD PRESSURE READING: ICD-10-CM

## 2023-03-15 DIAGNOSIS — R47.81 SLURRED SPEECH: ICD-10-CM

## 2023-03-15 LAB
ALBUMIN SERPL BCP-MCNC: 4 G/DL (ref 3.2–4.9)
ALBUMIN/GLOB SERPL: 1.2 G/DL
ALP SERPL-CCNC: 98 U/L (ref 30–99)
ALT SERPL-CCNC: 11 U/L (ref 2–50)
ANION GAP SERPL CALC-SCNC: 12 MMOL/L (ref 7–16)
APTT PPP: 28 SEC (ref 24.7–36)
AST SERPL-CCNC: 22 U/L (ref 12–45)
BASOPHILS # BLD AUTO: 0.7 % (ref 0–1.8)
BASOPHILS # BLD: 0.04 K/UL (ref 0–0.12)
BILIRUB SERPL-MCNC: <0.2 MG/DL (ref 0.1–1.5)
BUN SERPL-MCNC: 17 MG/DL (ref 8–22)
CALCIUM ALBUM COR SERPL-MCNC: 9.6 MG/DL (ref 8.5–10.5)
CALCIUM SERPL-MCNC: 9.6 MG/DL (ref 8.4–10.2)
CHLORIDE SERPL-SCNC: 100 MMOL/L (ref 96–112)
CO2 SERPL-SCNC: 23 MMOL/L (ref 20–33)
CREAT SERPL-MCNC: 0.7 MG/DL (ref 0.5–1.4)
EKG IMPRESSION: NORMAL
EOSINOPHIL # BLD AUTO: 0.09 K/UL (ref 0–0.51)
EOSINOPHIL NFR BLD: 1.7 % (ref 0–6.9)
ERYTHROCYTE [DISTWIDTH] IN BLOOD BY AUTOMATED COUNT: 42.6 FL (ref 35.9–50)
GFR SERPLBLD CREATININE-BSD FMLA CKD-EPI: 87 ML/MIN/1.73 M 2
GLOBULIN SER CALC-MCNC: 3.4 G/DL (ref 1.9–3.5)
GLUCOSE SERPL-MCNC: 124 MG/DL (ref 65–99)
HCT VFR BLD AUTO: 40.4 % (ref 37–47)
HGB BLD-MCNC: 13.9 G/DL (ref 12–16)
IMM GRANULOCYTES # BLD AUTO: 0.03 K/UL (ref 0–0.11)
IMM GRANULOCYTES NFR BLD AUTO: 0.6 % (ref 0–0.9)
INR PPP: 1.02 (ref 0.87–1.13)
LYMPHOCYTES # BLD AUTO: 0.99 K/UL (ref 1–4.8)
LYMPHOCYTES NFR BLD: 18.2 % (ref 22–41)
MCH RBC QN AUTO: 31.8 PG (ref 27–33)
MCHC RBC AUTO-ENTMCNC: 34.4 G/DL (ref 33.6–35)
MCV RBC AUTO: 92.4 FL (ref 81.4–97.8)
MONOCYTES # BLD AUTO: 0.52 K/UL (ref 0–0.85)
MONOCYTES NFR BLD AUTO: 9.6 % (ref 0–13.4)
NEUTROPHILS # BLD AUTO: 3.76 K/UL (ref 2–7.15)
NEUTROPHILS NFR BLD: 69.2 % (ref 44–72)
NRBC # BLD AUTO: 0 K/UL
NRBC BLD-RTO: 0 /100 WBC
PLATELET # BLD AUTO: 343 K/UL (ref 164–446)
PMV BLD AUTO: 8.6 FL (ref 9–12.9)
POTASSIUM SERPL-SCNC: 3.8 MMOL/L (ref 3.6–5.5)
PROT SERPL-MCNC: 7.4 G/DL (ref 6–8.2)
PROTHROMBIN TIME: 13.3 SEC (ref 12–14.6)
RBC # BLD AUTO: 4.37 M/UL (ref 4.2–5.4)
SODIUM SERPL-SCNC: 135 MMOL/L (ref 135–145)
WBC # BLD AUTO: 5.4 K/UL (ref 4.8–10.8)

## 2023-03-15 PROCEDURE — 94760 N-INVAS EAR/PLS OXIMETRY 1: CPT

## 2023-03-15 PROCEDURE — 85610 PROTHROMBIN TIME: CPT

## 2023-03-15 PROCEDURE — 85730 THROMBOPLASTIN TIME PARTIAL: CPT

## 2023-03-15 PROCEDURE — 99284 EMERGENCY DEPT VISIT MOD MDM: CPT

## 2023-03-15 PROCEDURE — 36415 COLL VENOUS BLD VENIPUNCTURE: CPT

## 2023-03-15 PROCEDURE — 85025 COMPLETE CBC W/AUTO DIFF WBC: CPT

## 2023-03-15 PROCEDURE — 93005 ELECTROCARDIOGRAM TRACING: CPT | Performed by: EMERGENCY MEDICINE

## 2023-03-15 PROCEDURE — 80053 COMPREHEN METABOLIC PANEL: CPT

## 2023-03-15 PROCEDURE — 700102 HCHG RX REV CODE 250 W/ 637 OVERRIDE(OP): Performed by: EMERGENCY MEDICINE

## 2023-03-15 PROCEDURE — 70450 CT HEAD/BRAIN W/O DYE: CPT

## 2023-03-15 PROCEDURE — A9270 NON-COVERED ITEM OR SERVICE: HCPCS | Performed by: EMERGENCY MEDICINE

## 2023-03-15 RX ORDER — ACETAMINOPHEN 325 MG/1
650 TABLET ORAL ONCE
Status: COMPLETED | OUTPATIENT
Start: 2023-03-15 | End: 2023-03-15

## 2023-03-15 RX ADMIN — ACETAMINOPHEN 650 MG: 325 TABLET, FILM COATED ORAL at 20:15

## 2023-03-15 ASSESSMENT — FIBROSIS 4 INDEX: FIB4 SCORE: 1.78

## 2023-03-15 ASSESSMENT — PAIN DESCRIPTION - PAIN TYPE: TYPE: ACUTE PAIN

## 2023-03-16 NOTE — ED NOTES
Received patient in room 3, aao x 4,  at bedside. Marvel hooked to cardiac monitor, PIV placed, labs drawn and sent to lab.

## 2023-03-16 NOTE — DISCHARGE INSTRUCTIONS
Return the emergency department if you have new or different headache, unilateral weakness, vision changes, difficulty speaking, slurred speech or facial droop.

## 2023-03-16 NOTE — ED NOTES
Patient up to the bathroom, walks with steady gait. Verbalises pain getting worse but refused pain meds as of now.

## 2023-03-16 NOTE — ED NOTES
Discharge instructions discussed with patient and  including medications and symptoms to watch out for. Patient and  were taught how to use FAST to assess for signs of stroke. Encouraged to return to ER for any new or worsening symptoms.Patient and  both verbalized understanding.

## 2023-03-16 NOTE — ED NOTES
Triage RN spoke w/ LORIE Wang about pt concern, not activating stroke at this time but pt immediately roomed to ER 3

## 2023-03-16 NOTE — ED PROVIDER NOTES
ER Provider Note    Scribed for Omari Wang M.d. by Kyle Valera. 3/15/2023  7:08 PM    Primary Care Provider: Opal Delaney M.D.    CHIEF COMPLAINT  Chief Complaint   Patient presents with    Headache     Onset today and severe per pt  Pt s/p CHI after a syncopal episode 2 1/2 wks ago Negative CT at that time  No blood thinners Denies neck pain No N/V    Slurred Speech      noted mild speech slurring 1 hr ago  No balance problems No diff swallowing  Denies visual deficits  No weakness in ext  No numbness or tingling in ext    ALOC     Per  pt drowsy x 1 hr  Pt listless but able to hold a conversation with spouse  GCS 15 O x 4     LIMITATION TO HISTORY   Select: : None    HPI/ROS  OUTSIDE HISTORIAN(S):  Significant other     EXTERNAL RECORDS REVIEWED  Inpatient Notes ED [facility] 03/05/2023    Court Cortes is a 82 y.o. female who presents to the ED for headache onset 1 hour ago. The patient states that the headache started gradually and came around her head starting from the back. She states that she fainted one week ago and prompted to the ED where she received a full workup but found no abnormalities. She denies taking any blood thinners. The patient's  reports that the patient has had some mild slurred speech. Patient states that she maybe will get one headache a year and reports her current headache a 7/10 in pain. Patient denies vomiting. No alleviating or exacerbating factors reported. Patient has a past medical history of SAH and vasovagal syncope.  Patient denies any numbness or tingling anywhere, denies any weakness, chest pain, shortness of breath, cough    PAST MEDICAL HISTORY  Past Medical History:   Diagnosis Date    SAH (subarachnoid hemorrhage) ([facility]) 1/23/2015    Vasovagal syncope 2/19/2020    -It sounds like some of her episodes are from vasovagal over stimulation.  I explained that such as with eating a large meal that the vagal nerve can get  "overstimulated and causes syncope.  So if this is a trigger for her it would be helpful to eat smaller more frequent meals. -In addition, I have talked about the possibility that this could be compounded with hypovolemia and or hyponatremia.  I h       SURGICAL HISTORY  Past Surgical History:   Procedure Laterality Date    TONSILLECTOMY         FAMILY HISTORY  Family History   Problem Relation Age of Onset    Stroke Sister     Hypertension Sister     Stroke Brother     Hypertension Brother        SOCIAL HISTORY   reports that she quit smoking about 49 years ago. Her smoking use included cigarettes. She started smoking about 56 years ago. She has a 1.75 pack-year smoking history. She has never used smokeless tobacco. She reports that she does not drink alcohol and does not use drugs.    CURRENT MEDICATIONS  Discharge Medication List as of 3/15/2023  8:38 PM        CONTINUE these medications which have NOT CHANGED    Details   CALCIUM PO Take 1 Tablet by mouth 2 times a day., Historical Med      Cholecalciferol (D3 PO) Take 1 Capsule by mouth every evening., Historical Med      Ascorbic Acid (VITAMIN C PO) Take 1 Tablet by mouth every evening., Historical Med      Probiotic Product (CULTURELLE PROBIOTICS PO) Take 1 Capsule by mouth every evening., Historical Med      Cyanocobalamin (B-12 PO) Take 1 Tablet by mouth every evening., Historical Med             ALLERGIES  Sulfamethoxazole-trimethoprim [bactrim ds]    PHYSICAL EXAM  BP (!) 140/78   Pulse (!) 101   Temp 37.3 °C (99.2 °F) (Temporal)   Resp 16   Ht 1.499 m (4' 11\")   Wt 40.7 kg (89 lb 11.6 oz)   SpO2 94%   BMI 18.12 kg/m²     Constitutional: Well developed, Well nourished, mild distress.   HENT: Normocephalic, Oropharynx moist, No oral exudates. Hematoma to right parietal region and right cheek.   Eyes: Conjunctiva normal, No discharge. Pupils 3 mm, reactive, EOM intact.  Neck: Supple, No stridor, no vertebral point tenderness.   Cardiovascular: Normal " heart rate, Normal rhythm, No murmurs, equal pulses.   Pulmonary: Normal breath sounds, No respiratory distress, No wheezing, No rales, No rhonchi.  Chest: No chest wall tenderness or deformity.   Abdomen:Soft, No tenderness, No masses, no rebound, no guarding.   Back: No CVA tenderness. No vertebral point tenderness.  Musculoskeletal: No major deformities noted, No tenderness.   Skin: Warm, Dry,   Neurologic: Alert & oriented x 3, Normal motor function,  No focal deficits noted. Normal finger to nose, Normal cranial nerves II-XII, No pronator drift. Equal strength in upper and lower extremities bilaterally. No slurred speech.  Psychiatric: Affect normal, Judgment normal, Mood normal.     DIAGNOSTIC STUDIES & PROCEDURES    Labs:   Results for orders placed or performed during the hospital encounter of 03/15/23   CBC WITH DIFFERENTIAL   Result Value Ref Range    WBC 5.4 4.8 - 10.8 K/uL    RBC 4.37 4.20 - 5.40 M/uL    Hemoglobin 13.9 12.0 - 16.0 g/dL    Hematocrit 40.4 37.0 - 47.0 %    MCV 92.4 81.4 - 97.8 fL    MCH 31.8 27.0 - 33.0 pg    MCHC 34.4 33.6 - 35.0 g/dL    RDW 42.6 35.9 - 50.0 fL    Platelet Count 343 164 - 446 K/uL    MPV 8.6 (L) 9.0 - 12.9 fL    Neutrophils-Polys 69.20 44.00 - 72.00 %    Lymphocytes 18.20 (L) 22.00 - 41.00 %    Monocytes 9.60 0.00 - 13.40 %    Eosinophils 1.70 0.00 - 6.90 %    Basophils 0.70 0.00 - 1.80 %    Immature Granulocytes 0.60 0.00 - 0.90 %    Nucleated RBC 0.00 /100 WBC    Neutrophils (Absolute) 3.76 2.00 - 7.15 K/uL    Lymphs (Absolute) 0.99 (L) 1.00 - 4.80 K/uL    Monos (Absolute) 0.52 0.00 - 0.85 K/uL    Eos (Absolute) 0.09 0.00 - 0.51 K/uL    Baso (Absolute) 0.04 0.00 - 0.12 K/uL    Immature Granulocytes (abs) 0.03 0.00 - 0.11 K/uL    NRBC (Absolute) 0.00 K/uL   COMP METABOLIC PANEL   Result Value Ref Range    Sodium 135 135 - 145 mmol/L    Potassium 3.8 3.6 - 5.5 mmol/L    Chloride 100 96 - 112 mmol/L    Co2 23 20 - 33 mmol/L    Anion Gap 12.0 7.0 - 16.0    Glucose 124 (H)  65 - 99 mg/dL    Bun 17 8 - 22 mg/dL    Creatinine 0.70 0.50 - 1.40 mg/dL    Calcium 9.6 8.4 - 10.2 mg/dL    AST(SGOT) 22 12 - 45 U/L    ALT(SGPT) 11 2 - 50 U/L    Alkaline Phosphatase 98 30 - 99 U/L    Total Bilirubin <0.2 0.1 - 1.5 mg/dL    Albumin 4.0 3.2 - 4.9 g/dL    Total Protein 7.4 6.0 - 8.2 g/dL    Globulin 3.4 1.9 - 3.5 g/dL    A-G Ratio 1.2 g/dL   APTT   Result Value Ref Range    APTT 28.0 24.7 - 36.0 sec   PROTHROMBIN TIME (INR)   Result Value Ref Range    PT 13.3 12.0 - 14.6 sec    INR 1.02 0.87 - 1.13   CORRECTED CALCIUM   Result Value Ref Range    Correct Calcium 9.6 8.5 - 10.5 mg/dL   ESTIMATED GFR   Result Value Ref Range    GFR (CKD-EPI) 87 >60 mL/min/1.73 m 2   EKG (NOW)   Result Value Ref Range    Report       [facility] Emergency Dept.    Test Date:  2023-03-15  Pt Name:    RUBY SANDERS                  Department: [facility]  MRN:        [id number]                      Room:       -ROOM 3  Gender:     Female                       Technician: [id number]  :        [id number]                   Requested By:DANIELLA ZHANG  Order #:    [id number]                    Reading MD: DANIELLA ZHANG MD    Measurements  Intervals                                Axis  Rate:       95                           P:          72  MN:         184                          QRS:        73  QRSD:       80                           T:          54  QT:         354  QTc:        445    Interpretive Statements  Sinus rhythm, Rate of 95, normal axis, no ST elevation  Probable left atrial enlargement  Compared to ECG 2023 09:22:00  Sinus tachycardia no longer present  T-wave abnormality no longer present  Electronically Signed On 3- 23:20:04 PDT by DANIELLA ZHANG MD       All labs reviewed by me.    EKG:   I have independently interpreted this EKG as seen above.     Radiology:   The attending Emergency Physician has independently interpreted the diagnostic imaging associated with  this visit and is awaiting the final reading from the radiologist, which will be displayed below.    Preliminary interpretation is a follows: No obvious intracranial hemorrhage  Radiologist interpretation:    CT-HEAD W/O   Final Result      1.  There is no acute intracranial hemorrhage or infarct. Interval decrease in size of the right frontal scalp hematoma.      2.  White matter lucencies most consistent with small vessel ischemic change versus demyelination or gliosis.      3.  There is cerebral atrophy.              COURSE & MEDICAL DECISION MAKING    ED Observation Status? Yes; I am placing the patient in to an observation status due to a diagnostic uncertainty as well as therapeutic intensity. Patient placed in observation status at 7:15 PM, 3/15/2023.     Observation plan is as follows: Observe patient's headache and any possible new stroke symptoms awaiting diagnostic studies.    Upon Reevaluation, the patient's condition has: Improved; and will be discharged.    Patient discharged from ED Observation status at 8:40 PM (Time) 03/15/2023 (Date).     INITIAL ASSESSMENT AND PLAN  Care Narrative:       7:08 PM - Patient seen and evaluated at bedside. Ordered CT-Head without, CBC w diff, CMP, APTT, INR, and EKG to evaluate. She understands and agrees to the plan of care. Differential diagnoses include but are not limited to: Intracranial hemorrhage, post concussion headache, stroke, migraine, hypertensive urgency    7:52 PM - Patient was reevaluated at bedside. She reports that she still has a headache. Treated patient with Tylenol 650 mg for her symptoms. Discussed lab and radiology results with the patient and informed them that there was no intracranial hemorrhage seen upon CT.     8:40 PM - Patient was reevaluated at bedside. Patient's headache has improved after treatment and her blood pressure has also improved. Patient had the opportunity to ask any questions. The plan for discharge was discussed with them  and they were told to return for any new or worsening symptoms. She was also informed of the plans for follow up. Patient is understanding and agreeable to the plan for discharge.    PROBLEM LIST AND DISPOSITION  Problem #1 headache at this point time patient's headache is gone away with a simple dose of Tylenol.  CT of the head was done to rule out intracranial hemorrhage given the patient's age and recent trauma I was worried about possible delayed bleed.  This does not show any intracranial hemorrhage or fracture.  Patient was given a single dose of Tylenol and her headache is gone away.  Patient's  reported some slurred speech but on my exam as well as that of the nurse patient did not have any slurred speech.  Patient is alert and oriented x4 and is at baseline at this point time I do not think she had a TIA.  I think at this point time she can be discharged to follow-up with her primary care    PT and PTT were ordered secondary to the fact I was worried about possible intracranial hemorrhage given the patient's slurred speech and recent trauma.               DISPOSITION AND DISCUSSIONS  Escalation of care considered, and ultimately not performed: acute inpatient care management, however at this time, the patient is most appropriate for outpatient management.      The patient will return for new or worsening symptoms and is stable at the time of discharge.    The patient is referred to a primary physician for blood pressure management, diabetic screening, and for all other preventative health concerns.    DISPOSITION:  Patient will be discharged home in stable condition.    FOLLOW UP:  Opal Delaney M.D.  [address] [place] [id number]  [phone]    Schedule an appointment as soon as possible for a visit in 1 week  have them recheck your blood pressure      FINAL IMPRESSION   1. Acute post-traumatic headache, not intractable    2. Slurred speech    3. Elevated blood pressure reading       I, Kyle Valera (Peggyibdena), am scribing for, and in the presence of, SHELBY Lynn*.    Electronically signed by: Kyle Valera (Faustino), 3/15/2023    Omari BAILON M.* personally performed the services described in this documentation, as scribed by Kyle Valera in my presence, and it is both accurate and complete.    The note accurately reflects work and decisions made by me.  Omari Wang M.D.  3/15/2023  11:22 PM

## 2023-07-25 ENCOUNTER — OFFICE VISIT (OUTPATIENT)
Dept: MEDICAL GROUP | Facility: MEDICAL CENTER | Age: 83
End: 2023-07-25
Payer: MEDICARE

## 2023-07-25 VITALS
HEIGHT: 59 IN | DIASTOLIC BLOOD PRESSURE: 68 MMHG | HEART RATE: 64 BPM | OXYGEN SATURATION: 96 % | RESPIRATION RATE: 16 BRPM | TEMPERATURE: 97.6 F | SYSTOLIC BLOOD PRESSURE: 120 MMHG | WEIGHT: 82 LBS | BODY MASS INDEX: 16.53 KG/M2

## 2023-07-25 DIAGNOSIS — R42 VERTIGO: ICD-10-CM

## 2023-07-25 DIAGNOSIS — R73.03 PREDIABETES: ICD-10-CM

## 2023-07-25 DIAGNOSIS — E55.9 VITAMIN D DEFICIENCY: ICD-10-CM

## 2023-07-25 DIAGNOSIS — E78.5 HYPERLIPIDEMIA, UNSPECIFIED HYPERLIPIDEMIA TYPE: ICD-10-CM

## 2023-07-25 DIAGNOSIS — L82.1 SEBORRHEIC KERATOSIS: ICD-10-CM

## 2023-07-25 PROCEDURE — 3074F SYST BP LT 130 MM HG: CPT | Performed by: FAMILY MEDICINE

## 2023-07-25 PROCEDURE — 99214 OFFICE O/P EST MOD 30 MIN: CPT | Performed by: FAMILY MEDICINE

## 2023-07-25 PROCEDURE — 3078F DIAST BP <80 MM HG: CPT | Performed by: FAMILY MEDICINE

## 2023-07-25 ASSESSMENT — FIBROSIS 4 INDEX: FIB4 SCORE: 1.55

## 2023-07-25 NOTE — PROGRESS NOTES
"  Subjective:     Court Cortes is a 82 y.o. female presenting for a follow up.  She reports an episode of vertigo about 3 days ago.  Describes room spinning sensation that was associated with nausea, vomiting, decreased appetite, fatigue.  He denies any vision or hearing changes, balance issues, fevers, allergies, urinary symptoms.  She was taking Dramamine, which made her quite sleepy.  Her symptoms have resolved today and feels back to baseline.        Current Outpatient Medications:     CALCIUM PO, Take 1 Tablet by mouth 2 times a day., Disp: , Rfl:     Cholecalciferol (D3 PO), Take 1 Capsule by mouth every evening., Disp: , Rfl:     Ascorbic Acid (VITAMIN C PO), Take 1 Tablet by mouth every evening., Disp: , Rfl:     Probiotic Product (CULTURELLE PROBIOTICS PO), Take 1 Capsule by mouth every evening., Disp: , Rfl:     Cyanocobalamin (B-12 PO), Take 1 Tablet by mouth every evening., Disp: , Rfl:     Objective:     Vitals: /68   Pulse 64   Temp 36.4 °C (97.6 °F)   Resp 16   Ht 1.499 m (4' 11\")   Wt 37.2 kg (82 lb)   SpO2 96%   BMI 16.56 kg/m²   General: Alert  HEENT: Normocephalic.  Skin: Approximately 3 mm in diameter hyperpigmented, slightly flaky papule on the central chest that has a stuck on appearance    Assessment/Plan:     Diagnoses and all orders for this visit:    Vertigo  Self-limited issue.  We discussed checking an MRI, she declined.  If she has another episode, we discussed trying Epley maneuvers.    Seborrheic keratosis  Chronic, stable.  She has a lesion on her central chest that has been stable for quite some time, is not bothersome.  It appears consistent with an SK.  She will let us know if anything changes or if it becomes irritating.    Hyperlipidemia, unspecified hyperlipidemia type  Chronic, stable, continue to monitor  -     Comp Metabolic Panel; Future  -     Lipid Profile; Future  -     CRP QUANTITIVE (NON-CARDIAC); Future    Prediabetes  Chronic, stable, continue to " monitor  -     Comp Metabolic Panel; Future  -     HEMOGLOBIN A1C; Future    Vitamin D deficiency  Chronic, stable, continue supplements.  She does not want to check a vitamin D level yet  -     CBC WITHOUT DIFFERENTIAL; Future  -     Comp Metabolic Panel; Future  -     Cancel: VITAMIN D,25 HYDROXY (DEFICIENCY); Future

## 2023-08-02 ENCOUNTER — HOSPITAL ENCOUNTER (OUTPATIENT)
Dept: LAB | Facility: MEDICAL CENTER | Age: 83
End: 2023-08-02
Attending: FAMILY MEDICINE
Payer: MEDICARE

## 2023-08-02 DIAGNOSIS — E78.5 HYPERLIPIDEMIA, UNSPECIFIED HYPERLIPIDEMIA TYPE: ICD-10-CM

## 2023-08-02 DIAGNOSIS — E55.9 VITAMIN D DEFICIENCY: ICD-10-CM

## 2023-08-02 DIAGNOSIS — R73.03 PREDIABETES: ICD-10-CM

## 2023-08-02 LAB
ALBUMIN SERPL BCP-MCNC: 4.2 G/DL (ref 3.2–4.9)
ALBUMIN/GLOB SERPL: 1.2 G/DL
ALP SERPL-CCNC: 82 U/L (ref 30–99)
ALT SERPL-CCNC: 10 U/L (ref 2–50)
ANION GAP SERPL CALC-SCNC: 9 MMOL/L (ref 7–16)
AST SERPL-CCNC: 18 U/L (ref 12–45)
BILIRUB SERPL-MCNC: 0.4 MG/DL (ref 0.1–1.5)
BUN SERPL-MCNC: 12 MG/DL (ref 8–22)
CALCIUM ALBUM COR SERPL-MCNC: 9.4 MG/DL (ref 8.5–10.5)
CALCIUM SERPL-MCNC: 9.6 MG/DL (ref 8.5–10.5)
CHLORIDE SERPL-SCNC: 99 MMOL/L (ref 96–112)
CHOLEST SERPL-MCNC: 163 MG/DL (ref 100–199)
CO2 SERPL-SCNC: 27 MMOL/L (ref 20–33)
CREAT SERPL-MCNC: 0.79 MG/DL (ref 0.5–1.4)
CRP SERPL HS-MCNC: 0.45 MG/DL (ref 0–0.75)
ERYTHROCYTE [DISTWIDTH] IN BLOOD BY AUTOMATED COUNT: 43.8 FL (ref 35.9–50)
EST. AVERAGE GLUCOSE BLD GHB EST-MCNC: 117 MG/DL
GFR SERPLBLD CREATININE-BSD FMLA CKD-EPI: 75 ML/MIN/1.73 M 2
GLOBULIN SER CALC-MCNC: 3.6 G/DL (ref 1.9–3.5)
GLUCOSE SERPL-MCNC: 85 MG/DL (ref 65–99)
HBA1C MFR BLD: 5.7 % (ref 4–5.6)
HCT VFR BLD AUTO: 42.6 % (ref 37–47)
HDLC SERPL-MCNC: 73 MG/DL
HGB BLD-MCNC: 14.3 G/DL (ref 12–16)
LDLC SERPL CALC-MCNC: 80 MG/DL
MCH RBC QN AUTO: 31.4 PG (ref 27–33)
MCHC RBC AUTO-ENTMCNC: 33.6 G/DL (ref 32.2–35.5)
MCV RBC AUTO: 93.4 FL (ref 81.4–97.8)
PLATELET # BLD AUTO: 267 K/UL (ref 164–446)
PMV BLD AUTO: 9.2 FL (ref 9–12.9)
POTASSIUM SERPL-SCNC: 3.8 MMOL/L (ref 3.6–5.5)
PROT SERPL-MCNC: 7.8 G/DL (ref 6–8.2)
RBC # BLD AUTO: 4.56 M/UL (ref 4.2–5.4)
SODIUM SERPL-SCNC: 135 MMOL/L (ref 135–145)
TRIGL SERPL-MCNC: 49 MG/DL (ref 0–149)
WBC # BLD AUTO: 3.6 K/UL (ref 4.8–10.8)

## 2023-08-02 PROCEDURE — 80053 COMPREHEN METABOLIC PANEL: CPT

## 2023-08-02 PROCEDURE — 86140 C-REACTIVE PROTEIN: CPT

## 2023-08-02 PROCEDURE — 83036 HEMOGLOBIN GLYCOSYLATED A1C: CPT

## 2023-08-02 PROCEDURE — 36415 COLL VENOUS BLD VENIPUNCTURE: CPT

## 2023-08-02 PROCEDURE — 80061 LIPID PANEL: CPT

## 2023-08-02 PROCEDURE — 85027 COMPLETE CBC AUTOMATED: CPT

## 2023-08-11 ENCOUNTER — HOSPITAL ENCOUNTER (OUTPATIENT)
Dept: CARDIOLOGY | Facility: MEDICAL CENTER | Age: 83
End: 2023-08-11
Attending: FAMILY MEDICINE
Payer: MEDICARE

## 2023-08-11 DIAGNOSIS — R01.1 HEART MURMUR: ICD-10-CM

## 2023-08-11 DIAGNOSIS — R55 SYNCOPE, UNSPECIFIED SYNCOPE TYPE: ICD-10-CM

## 2023-08-11 LAB
LV EJECT FRACT  99904: 60
LV EJECT FRACT MOD 2C 99903: 66.64
LV EJECT FRACT MOD 4C 99902: 60.68
LV EJECT FRACT MOD BP 99901: 63.78

## 2023-08-11 PROCEDURE — 93306 TTE W/DOPPLER COMPLETE: CPT | Mod: 26 | Performed by: INTERNAL MEDICINE

## 2023-08-11 PROCEDURE — 93306 TTE W/DOPPLER COMPLETE: CPT

## 2023-09-20 ENCOUNTER — DOCUMENTATION (OUTPATIENT)
Dept: HEALTH INFORMATION MANAGEMENT | Facility: OTHER | Age: 83
End: 2023-09-20
Payer: COMMERCIAL

## 2023-12-20 ENCOUNTER — TELEPHONE (OUTPATIENT)
Dept: HEALTH INFORMATION MANAGEMENT | Facility: OTHER | Age: 83
End: 2023-12-20
Payer: COMMERCIAL

## 2024-08-29 ENCOUNTER — APPOINTMENT (OUTPATIENT)
Dept: RADIOLOGY | Facility: MEDICAL CENTER | Age: 84
End: 2024-08-29
Attending: EMERGENCY MEDICINE
Payer: COMMERCIAL

## 2024-08-29 ENCOUNTER — HOSPITAL ENCOUNTER (EMERGENCY)
Facility: MEDICAL CENTER | Age: 84
End: 2024-08-29
Attending: EMERGENCY MEDICINE
Payer: COMMERCIAL

## 2024-08-29 VITALS
WEIGHT: 84.88 LBS | SYSTOLIC BLOOD PRESSURE: 145 MMHG | DIASTOLIC BLOOD PRESSURE: 73 MMHG | TEMPERATURE: 98.9 F | BODY MASS INDEX: 17.11 KG/M2 | OXYGEN SATURATION: 94 % | RESPIRATION RATE: 20 BRPM | HEART RATE: 96 BPM | HEIGHT: 59 IN
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
## 2024-08-29 DIAGNOSIS — S02.40DA CLOSED FRACTURE OF LEFT SIDE OF MAXILLA, INITIAL ENCOUNTER (HCC): ICD-10-CM

## 2024-08-29 DIAGNOSIS — S42.292A CLOSED FRACTURE OF HEAD OF LEFT HUMERUS, INITIAL ENCOUNTER: ICD-10-CM

## 2024-08-29 PROCEDURE — 700102 HCHG RX REV CODE 250 W/ 637 OVERRIDE(OP): Performed by: EMERGENCY MEDICINE

## 2024-08-29 PROCEDURE — 70486 CT MAXILLOFACIAL W/O DYE: CPT

## 2024-08-29 PROCEDURE — 72125 CT NECK SPINE W/O DYE: CPT

## 2024-08-29 PROCEDURE — 71045 X-RAY EXAM CHEST 1 VIEW: CPT

## 2024-08-29 PROCEDURE — 70450 CT HEAD/BRAIN W/O DYE: CPT

## 2024-08-29 PROCEDURE — 73030 X-RAY EXAM OF SHOULDER: CPT | Mod: LT

## 2024-08-29 PROCEDURE — A9270 NON-COVERED ITEM OR SERVICE: HCPCS | Performed by: EMERGENCY MEDICINE

## 2024-08-29 PROCEDURE — 99284 EMERGENCY DEPT VISIT MOD MDM: CPT

## 2024-08-29 RX ORDER — IBUPROFEN 600 MG/1
600 TABLET, FILM COATED ORAL ONCE
Status: DISCONTINUED | OUTPATIENT
Start: 2024-08-29 | End: 2024-08-29 | Stop reason: HOSPADM

## 2024-08-29 RX ORDER — ACETAMINOPHEN 325 MG/1
650 TABLET ORAL ONCE
Status: COMPLETED | OUTPATIENT
Start: 2024-08-29 | End: 2024-08-29

## 2024-08-29 RX ADMIN — AMOXICILLIN AND CLAVULANATE POTASSIUM 1 TABLET: 875; 125 TABLET, FILM COATED ORAL at 21:22

## 2024-08-29 RX ADMIN — ACETAMINOPHEN 650 MG: 325 TABLET ORAL at 20:07

## 2024-08-29 ASSESSMENT — FIBROSIS 4 INDEX: FIB4 SCORE: 1.73

## 2024-08-30 NOTE — ED PROVIDER NOTES
ED Provider Note    CHIEF COMPLAINT  Chief Complaint   Patient presents with    GLF     Tripped over a shoe tray and fell. Pt states she hit her left shoulder and left side of face. Denies loc, or blood thinners. No obv neuro deficits noted in triage. Left shoulder pain as well. Pt in sling. Denies any midline neck or back pain.     Facial Pain    Shoulder Pain     EXTERNAL RECORDS REVIEWED  Other no outside records reviewed.    HPI/ROS  LIMITATION TO HISTORY   Select: : None  OUTSIDE HISTORIAN(S):  Significant other     Court Cortes is a 83 y.o. female who presents to the emergency department for evaluation after mechanical ground-level fall.  Patient states she was carrying plates and from the garage and tripped on her shoe tray.  She landed on her left side.  She hit her left face and landed on her left arm.  She does not take any blood thinning medications.  She did not lose consciousness.  Denies neck and back pain.  She presents with bruising and swelling of the left side of her face and pain over her left shoulder.     PAST MEDICAL HISTORY   has a past medical history of SAH (subarachnoid hemorrhage) ([facility]) (2015) and Vasovagal syncope (2020).    SURGICAL HISTORY   has a past surgical history that includes tonsillectomy.    FAMILY HISTORY  Family History   Problem Relation Age of Onset    Stroke Sister     Hypertension Sister     Stroke Brother     Hypertension Brother        SOCIAL HISTORY  Social History     Tobacco Use    Smoking status: Former     Current packs/day: 0.00     Average packs/day: 0.3 packs/day for 7.0 years (1.8 ttl pk-yrs)     Types: Cigarettes     Start date:      Quit date: 1974     Years since quittin.6    Smokeless tobacco: Never   Vaping Use    Vaping status: Never Used   Substance and Sexual Activity    Alcohol use: No    Drug use: No    Sexual activity: Never     Partners: Male       CURRENT MEDICATIONS  Home Medications       Reviewed by Jean AFY  "CATIE aMddox (Registered Nurse) on 08/29/24 at 1832  Med List Status: Not Addressed     Medication Last Dose Status   Ascorbic Acid (VITAMIN C PO)  Active   CALCIUM PO  Active   Cholecalciferol (D3 PO)  Active   Cyanocobalamin (B-12 PO)  Active   Probiotic Product (CULTURELLE PROBIOTICS PO)  Active                    ALLERGIES  Allergies   Allergen Reactions    Sulfamethoxazole-Trimethoprim [Bactrim Ds] Diarrhea     Pt reports that she got dehydrated and had bad diarrhea     PHYSICAL EXAM  VITAL SIGNS: BP (!) 145/73   Pulse 96   Temp 37.2 °C (98.9 °F) (Temporal)   Resp 20   Ht 1.499 m (4' 11\")   Wt 38.5 kg (84 lb 14 oz)   SpO2 94%   Breastfeeding No   BMI 17.14 kg/m²    General: Well-appearing, no acute distress.   Eyes: EOM grossly intact BL, no signs of entrapment.  Pupils equal and reactive bilaterally.  HENT: Bruising and swelling over the left check.  Normal dentition.  No jaw malocclusion.  No mandibular tenderness or swelling.  Neck: Normal ROM. No cervical lymphadenopathy or swelling. No midline cervical spine tenderness.   Lungs: Non-labored breathing. Clear to auscultation bilaterally. No wheezing or crackles.  Cardiac: Regular rate and rhythm. No murmurs. No lower extremity swelling. Equal and symmetric distal pulses. Well-perfused.  Abdomen: Soft, non-tender, non-distended. No rebound or guarding.   MSK: Tenderness over the left shoulder.   Skin: No rashes, lesions, bruising, or petechiae. Well-perfused.   Neuro: Grossly nonfocal neurologic exam. Face symmetric. Normal mentation.     EKG/LABS  N/A    RADIOLOGY/PROCEDURES   I have independently interpreted the diagnostic imaging associated with this visit and am waiting the final reading from the radiologist.   My preliminary interpretation is as follows:   Left Shoulder X-ray: Fracture of the humeral head.  CT head: No traumatic intracranial hemorrhage.  CT C spine: No acute traumatic fracture.  CT face: Fracture of the left zygomatic " arch.    Radiologist interpretation:  DX-CHEST-PORTABLE (1 VIEW)   Final Result         1.  No acute cardiopulmonary disease.      CT-CSPINE WITHOUT PLUS RECONS   Final Result         1.  Multilevel degenerative changes of the cervical spine limit diagnostic sensitivity of this examination, otherwise no acute traumatic bony injury of the cervical spine is apparent.   2.  Scattered focus in the T2 spinous process, stable since prior study, appearance suggesting bony island, otherwise indeterminate.   3.  Atherosclerosis      CT-MAXILLOFACIAL W/O PLUS RECONS   Final Result         1.  Left maxillary sinus anterior and lateral wall fracture   2.  Left orbital floor fracture without depression   3.  Mosaic traumatic arch fracture with overriding fracture fragments   4.  Left facial contusion and soft tissue gas compatible with trauma and maxillary sinus fracture.      CT-HEAD W/O   Final Result         1.  No acute intracranial abnormality is identified, there are nonspecific white matter changes, commonly associated with small vessel ischemic disease.  Associated mild cerebral atrophy is noted.   2.  Left maxillary sinus fractures, see dedicated CT face for further characterization   3.  Atherosclerosis.               DX-SHOULDER 2+ LEFT   Final Result      Acute nondisplaced humeral head greater tuberosity fracture.          COURSE & MEDICAL DECISION MAKING    ASSESSMENT, COURSE AND PLAN  Care Narrative:   Court Cortes is a 83 y.o. female who presents to the emergency department for evaluation after mechanical ground-level fall.  On initial assessment, ABCs are intact.  Vital signs are within normal limits.  She is significant swelling and bruising over the left side of her face.  Pupils are equal and reactive, EOM intact bilaterally without evidence of entrapment.  No dental injury.  No jaw malocclusion.  No facial instability.  No cervical spine tenderness.  She has tenderness over her left shoulder.   Differential includes shoulder dislocation versus fracture, facial fracture, traumatic intracranial hemorrhage, cervical spine injury.    X-rays of the left shoulder showed a humeral head fracture.  Chest x-ray was without any abnormalities, no rib fractures, no pneumothorax.  CT imaging of the head and neck did not show any traumatic intracranial hemorrhage or fractures.  CT imaging of the face showed a left maxillary sinus anterior and lateral wall fracture, left orbital floor fracture, traumatic arch fracture with overriding fracture fragments.  She received Tylenol and ibuprofen while in the emergency department.    I spoke with the on-call facial fracture provider Dr. Barajas who reviewed the CT imaging of the face, and per our discussion these fractures can be managed outpatient.    I placed referrals for maxillofacial surgery and orthopedic surgery.  I provided patient with the clinic information and phone numbers for Dr. Barajas's office and [facility].  Patient was given a sling.  I prescribed a 7-day course of Augmentin for the facial fractures.  I recommended Tylenol and ibuprofen as needed for pain.  All of her questions were answered and she was discharged in stable condition.    ADDITIONAL PROBLEMS MANAGED  N/A    DISPOSITION AND DISCUSSIONS  I have discussed management of the patient with the following physicians and NADEGE's: Maxillofacial surgery (Dr. Barajas)    Discussion of management with other QHP or appropriate source(s): None     Escalation of care considered, and ultimately not performed: N/A    Barriers to care at this time, including but not limited to:  N/A .     Decision tools and prescription drugs considered including, but not limited to:  N/A .    FINAL DIAGNOSIS  1. Closed fracture of head of left humerus, initial encounter    2. Closed fracture of left side of maxilla, initial encounter ([facility])    3. Fall, initial encounter         Electronically signed by: Janis PATEL  GINA Rock, 8/29/2024 6:36 PM

## 2024-08-30 NOTE — DISCHARGE INSTRUCTIONS
Today you were seen in the emergency department after mechanical ground-level fall.    You have a fracture in your humeral head, which is the part of your upper arm that articulates with your shoulder joint.  You will need to keep the shoulder immobilized in the sling until you follow-up with orthopedic surgeon clinic.  You can take the sling off to shower, but otherwise you should wear it at all times.  Please call the number provided tomorrow to follow-up with the [facility].    You also have multiple facial fractures in the left side your face.  You will need to follow-up with a facial surgeon in clinic. Dr. Barajas's office should call you tomorrow to schedule a follow up appointment.    You will need to take an antibiotic for the facial fractures.  The antibiotic is called Augmentin, you will take 1 tablet in the morning and 1 tablet in the evening for next 7 days.    You can use Tylenol and ibuprofen for pain.  You can alternate Tylenol and ibuprofen every 3 hours for pain control.

## 2024-08-30 NOTE — ED TRIAGE NOTES
"BIB spouse for following complaints.     Chief Complaint   Patient presents with    GLF     Tripped over a shoe tray and fell. Pt states she hit her left shoulder and left side of face. Denies loc, or blood thinners. No obv neuro deficits noted in triage. Left shoulder pain as well. Pt in sling. Denies any midline neck or back pain.     Facial Pain    Shoulder Pain     BP (!) 172/91   Pulse 76   Temp 37.2 °C (98.9 °F) (Temporal)   Resp 14   Ht 1.499 m (4' 11\")   Wt 38.5 kg (84 lb 14 oz)   SpO2 94%   Breastfeeding No   BMI 17.14 kg/m²     "

## 2024-08-30 NOTE — ED NOTES
Pt states she tripped over a shoe rack and fell onto her left shoulder and face about a half hour ago. Pt states she has pain in her left jaw and left shoulder. Pt denies being on blood thinners/ LOC/ neck pain on palpation or movement.

## 2025-03-25 ENCOUNTER — HOSPITAL ENCOUNTER (OUTPATIENT)
Dept: LAB | Facility: MEDICAL CENTER | Age: 85
End: 2025-03-25
Attending: FAMILY MEDICINE
Payer: MEDICARE

## 2025-03-25 LAB
ERYTHROCYTE [DISTWIDTH] IN BLOOD BY AUTOMATED COUNT: 48.3 FL (ref 35.9–50)
EST. AVERAGE GLUCOSE BLD GHB EST-MCNC: 117 MG/DL
HBA1C MFR BLD: 5.7 % (ref 4–5.6)
HCT VFR BLD AUTO: 40.4 % (ref 37–47)
HGB BLD-MCNC: 13.2 G/DL (ref 12–16)
MCH RBC QN AUTO: 31.1 PG (ref 27–33)
MCHC RBC AUTO-ENTMCNC: 32.7 G/DL (ref 32.2–35.5)
MCV RBC AUTO: 95.1 FL (ref 81.4–97.8)
PLATELET # BLD AUTO: 262 K/UL (ref 164–446)
PMV BLD AUTO: 8.9 FL (ref 9–12.9)
RBC # BLD AUTO: 4.25 M/UL (ref 4.2–5.4)
WBC # BLD AUTO: 4.7 K/UL (ref 4.8–10.8)

## 2025-03-25 PROCEDURE — 82306 VITAMIN D 25 HYDROXY: CPT

## 2025-03-25 PROCEDURE — 83036 HEMOGLOBIN GLYCOSYLATED A1C: CPT | Mod: GA

## 2025-03-25 PROCEDURE — 85027 COMPLETE CBC AUTOMATED: CPT

## 2025-03-25 PROCEDURE — 86140 C-REACTIVE PROTEIN: CPT

## 2025-03-25 PROCEDURE — 36415 COLL VENOUS BLD VENIPUNCTURE: CPT | Mod: GA

## 2025-03-25 PROCEDURE — 80061 LIPID PANEL: CPT

## 2025-03-25 PROCEDURE — 80048 BASIC METABOLIC PNL TOTAL CA: CPT

## 2025-03-26 LAB
25(OH)D3 SERPL-MCNC: 48 NG/ML (ref 30–100)
ANION GAP SERPL CALC-SCNC: 9 MMOL/L (ref 7–16)
BUN SERPL-MCNC: 12 MG/DL (ref 8–22)
CALCIUM SERPL-MCNC: 9.3 MG/DL (ref 8.5–10.5)
CHLORIDE SERPL-SCNC: 101 MMOL/L (ref 96–112)
CHOLEST SERPL-MCNC: 177 MG/DL (ref 100–199)
CO2 SERPL-SCNC: 24 MMOL/L (ref 20–33)
CREAT SERPL-MCNC: 0.71 MG/DL (ref 0.5–1.4)
CRP SERPL HS-MCNC: <0.3 MG/DL (ref 0–0.75)
FASTING STATUS PATIENT QL REPORTED: NORMAL
GFR SERPLBLD CREATININE-BSD FMLA CKD-EPI: 85 ML/MIN/1.73 M 2
GLUCOSE SERPL-MCNC: 69 MG/DL (ref 65–99)
HDLC SERPL-MCNC: 77 MG/DL
LDLC SERPL CALC-MCNC: 90 MG/DL
POTASSIUM SERPL-SCNC: 4.6 MMOL/L (ref 3.6–5.5)
SODIUM SERPL-SCNC: 134 MMOL/L (ref 135–145)
TRIGL SERPL-MCNC: 52 MG/DL (ref 0–149)